# Patient Record
Sex: MALE | Race: WHITE | URBAN - METROPOLITAN AREA
[De-identification: names, ages, dates, MRNs, and addresses within clinical notes are randomized per-mention and may not be internally consistent; named-entity substitution may affect disease eponyms.]

---

## 2017-07-11 ENCOUNTER — EMERGENCY (EMERGENCY)
Facility: HOSPITAL | Age: 72
LOS: 0 days | Discharge: HOME | End: 2017-07-11
Attending: EMERGENCY MEDICINE
Payer: MEDICARE

## 2017-07-11 VITALS
DIASTOLIC BLOOD PRESSURE: 73 MMHG | HEART RATE: 78 BPM | TEMPERATURE: 98 F | OXYGEN SATURATION: 98 % | WEIGHT: 164.91 LBS | RESPIRATION RATE: 16 BRPM | HEIGHT: 69 IN | SYSTOLIC BLOOD PRESSURE: 137 MMHG

## 2017-07-11 VITALS
SYSTOLIC BLOOD PRESSURE: 114 MMHG | RESPIRATION RATE: 17 BRPM | OXYGEN SATURATION: 99 % | HEART RATE: 63 BPM | TEMPERATURE: 98 F | DIASTOLIC BLOOD PRESSURE: 59 MMHG

## 2017-07-11 DIAGNOSIS — Z90.79 ACQUIRED ABSENCE OF OTHER GENITAL ORGAN(S): Chronic | ICD-10-CM

## 2017-07-11 DIAGNOSIS — R07.9 CHEST PAIN, UNSPECIFIED: ICD-10-CM

## 2017-07-11 DIAGNOSIS — E78.00 PURE HYPERCHOLESTEROLEMIA, UNSPECIFIED: ICD-10-CM

## 2017-07-11 DIAGNOSIS — F17.210 NICOTINE DEPENDENCE, CIGARETTES, UNCOMPLICATED: ICD-10-CM

## 2017-07-11 DIAGNOSIS — Z87.19 PERSONAL HISTORY OF OTHER DISEASES OF THE DIGESTIVE SYSTEM: Chronic | ICD-10-CM

## 2017-07-11 DIAGNOSIS — R09.3 ABNORMAL SPUTUM: ICD-10-CM

## 2017-07-11 DIAGNOSIS — R05 COUGH: ICD-10-CM

## 2017-07-11 PROBLEM — Z00.00 ENCOUNTER FOR PREVENTIVE HEALTH EXAMINATION: Status: ACTIVE | Noted: 2017-07-11

## 2017-07-11 LAB
ALBUMIN SERPL ELPH-MCNC: 3.8 G/DL — SIGNIFICANT CHANGE UP (ref 3.3–5)
ALP SERPL-CCNC: 87 U/L — SIGNIFICANT CHANGE UP (ref 40–120)
ALT FLD-CCNC: 31 U/L — SIGNIFICANT CHANGE UP (ref 12–78)
ANION GAP SERPL CALC-SCNC: 8 MMOL/L — SIGNIFICANT CHANGE UP (ref 5–17)
APTT BLD: 32.1 SEC — SIGNIFICANT CHANGE UP (ref 27.5–37.4)
AST SERPL-CCNC: 28 U/L — SIGNIFICANT CHANGE UP (ref 15–37)
BASOPHILS # BLD AUTO: 0.1 K/UL — SIGNIFICANT CHANGE UP (ref 0–0.2)
BASOPHILS NFR BLD AUTO: 0.7 % — SIGNIFICANT CHANGE UP (ref 0–2)
BILIRUB SERPL-MCNC: 0.3 MG/DL — SIGNIFICANT CHANGE UP (ref 0.2–1.2)
BUN SERPL-MCNC: 17 MG/DL — SIGNIFICANT CHANGE UP (ref 7–23)
CALCIUM SERPL-MCNC: 9.3 MG/DL — SIGNIFICANT CHANGE UP (ref 8.5–10.1)
CHLORIDE SERPL-SCNC: 106 MMOL/L — SIGNIFICANT CHANGE UP (ref 96–108)
CK MB BLD-MCNC: 1.4 % — SIGNIFICANT CHANGE UP (ref 0–3.5)
CK MB BLD-MCNC: 1.6 % — SIGNIFICANT CHANGE UP (ref 0–3.5)
CK MB CFR SERPL CALC: 1.6 NG/ML — SIGNIFICANT CHANGE UP (ref 0.5–3.6)
CK MB CFR SERPL CALC: 2.4 NG/ML — SIGNIFICANT CHANGE UP (ref 0.5–3.6)
CK SERPL-CCNC: 116 U/L — SIGNIFICANT CHANGE UP (ref 26–308)
CK SERPL-CCNC: 146 U/L — SIGNIFICANT CHANGE UP (ref 26–308)
CO2 SERPL-SCNC: 28 MMOL/L — SIGNIFICANT CHANGE UP (ref 22–31)
CREAT SERPL-MCNC: 1 MG/DL — SIGNIFICANT CHANGE UP (ref 0.5–1.3)
EOSINOPHIL # BLD AUTO: 0.2 K/UL — SIGNIFICANT CHANGE UP (ref 0–0.5)
EOSINOPHIL NFR BLD AUTO: 1.9 % — SIGNIFICANT CHANGE UP (ref 0–6)
GLUCOSE SERPL-MCNC: 90 MG/DL — SIGNIFICANT CHANGE UP (ref 70–99)
HCT VFR BLD CALC: 40.5 % — SIGNIFICANT CHANGE UP (ref 39–50)
HGB BLD-MCNC: 14.4 G/DL — SIGNIFICANT CHANGE UP (ref 13–17)
INR BLD: 0.95 RATIO — SIGNIFICANT CHANGE UP (ref 0.88–1.16)
LYMPHOCYTES # BLD AUTO: 2.2 K/UL — SIGNIFICANT CHANGE UP (ref 1–3.3)
LYMPHOCYTES # BLD AUTO: 22.7 % — SIGNIFICANT CHANGE UP (ref 13–44)
MCHC RBC-ENTMCNC: 31.4 PG — SIGNIFICANT CHANGE UP (ref 27–34)
MCHC RBC-ENTMCNC: 35.6 GM/DL — SIGNIFICANT CHANGE UP (ref 32–36)
MCV RBC AUTO: 88.1 FL — SIGNIFICANT CHANGE UP (ref 80–100)
MONOCYTES # BLD AUTO: 0.5 K/UL — SIGNIFICANT CHANGE UP (ref 0–0.9)
MONOCYTES NFR BLD AUTO: 5.2 % — SIGNIFICANT CHANGE UP (ref 2–14)
NEUTROPHILS # BLD AUTO: 6.7 K/UL — SIGNIFICANT CHANGE UP (ref 1.8–7.4)
NEUTROPHILS NFR BLD AUTO: 69.5 % — SIGNIFICANT CHANGE UP (ref 43–77)
NT-PROBNP SERPL-SCNC: 57 PG/ML — SIGNIFICANT CHANGE UP (ref 0–125)
PLATELET # BLD AUTO: 228 K/UL — SIGNIFICANT CHANGE UP (ref 150–400)
POTASSIUM SERPL-MCNC: 5 MMOL/L — SIGNIFICANT CHANGE UP (ref 3.5–5.3)
POTASSIUM SERPL-SCNC: 5 MMOL/L — SIGNIFICANT CHANGE UP (ref 3.5–5.3)
PROT SERPL-MCNC: 7.5 GM/DL — SIGNIFICANT CHANGE UP (ref 6–8.3)
PROTHROM AB SERPL-ACNC: 10.3 SEC — SIGNIFICANT CHANGE UP (ref 9.8–12.7)
RBC # BLD: 4.6 M/UL — SIGNIFICANT CHANGE UP (ref 4.2–5.8)
RBC # FLD: 12.3 % — SIGNIFICANT CHANGE UP (ref 11–15)
SODIUM SERPL-SCNC: 142 MMOL/L — SIGNIFICANT CHANGE UP (ref 135–145)
TROPONIN I SERPL-MCNC: <.015 NG/ML — SIGNIFICANT CHANGE UP (ref 0.01–0.04)
TROPONIN I SERPL-MCNC: <.015 NG/ML — SIGNIFICANT CHANGE UP (ref 0.01–0.04)
WBC # BLD: 9.6 K/UL — SIGNIFICANT CHANGE UP (ref 3.8–10.5)
WBC # FLD AUTO: 9.6 K/UL — SIGNIFICANT CHANGE UP (ref 3.8–10.5)

## 2017-07-11 PROCEDURE — 71010: CPT | Mod: 26

## 2017-07-11 PROCEDURE — 99285 EMERGENCY DEPT VISIT HI MDM: CPT

## 2017-07-11 RX ORDER — KETOROLAC TROMETHAMINE 30 MG/ML
15 SYRINGE (ML) INJECTION ONCE
Qty: 0 | Refills: 0 | Status: DISCONTINUED | OUTPATIENT
Start: 2017-07-11 | End: 2017-07-11

## 2017-07-11 RX ORDER — PANTOPRAZOLE SODIUM 20 MG/1
40 TABLET, DELAYED RELEASE ORAL ONCE
Qty: 0 | Refills: 0 | Status: COMPLETED | OUTPATIENT
Start: 2017-07-11 | End: 2017-07-11

## 2017-07-11 NOTE — ED PROVIDER NOTE - OBJECTIVE STATEMENT
71 years old male walked in c 71 years old male walked in c/o mid sternal chest pain 6/10 constant for 3 days but denies sob, dizziness, nausea, vomiting, abd pain. Pt also c/o productive coughs with yellow sputum for few days. Pt sts the chest pain increases with coughing and body movements. Pt denies recent hx of trauma, blurred visions, light sensitivities, fever, chills, dysuria, or irregular bowel movements. Pt sts he had a normal stress test few years ago.

## 2017-07-11 NOTE — ED ADULT NURSE NOTE - OBJECTIVE STATEMENT
patient received, came here for non radiating chest pain x 3 days. no sob noted. denies n/v/d. denies dizziness.

## 2017-07-11 NOTE — ED PROVIDER NOTE - PROGRESS NOTE DETAILS
Pt is alert and oriented x 3 sitting up eating and tolerating lunch now. Pt denies headache, dizziness, sob, chest pain, nausea, vomiting, abd pain first CE is negative and 2nd CE is due at 1400 pm. Pt remain very comfortable sitting up talking to his wife pt and his wife are given and explained all test reports and advised to see his cardiologist as soon as possible for out pt stress test. Pt denies headache, dizziness, sob, chest pain, nausea, vomiting, fever, chills, abd pain.

## 2017-07-11 NOTE — ED PROVIDER NOTE - CONSTITUTIONAL, MLM
normal... Well appearing, well nourished, awake, alert, oriented to person, place, time/situation and in no apparent distress. Speaking in clear full sentences no nasal flaring no shoulders retractions no diaphoresis not holding his chest, appears very comfortable sitting up at the edge of the stretcher in a bright light room.

## 2017-07-12 PROCEDURE — 93010 ELECTROCARDIOGRAM REPORT: CPT

## 2018-09-13 ENCOUNTER — EMERGENCY (EMERGENCY)
Facility: HOSPITAL | Age: 73
LOS: 0 days | Discharge: HOME | End: 2018-09-13
Attending: STUDENT IN AN ORGANIZED HEALTH CARE EDUCATION/TRAINING PROGRAM
Payer: MEDICARE

## 2018-09-13 VITALS
HEIGHT: 69 IN | RESPIRATION RATE: 16 BRPM | HEART RATE: 88 BPM | TEMPERATURE: 98 F | OXYGEN SATURATION: 97 % | WEIGHT: 160.06 LBS | SYSTOLIC BLOOD PRESSURE: 150 MMHG | DIASTOLIC BLOOD PRESSURE: 82 MMHG

## 2018-09-13 VITALS
RESPIRATION RATE: 17 BRPM | HEART RATE: 58 BPM | DIASTOLIC BLOOD PRESSURE: 68 MMHG | TEMPERATURE: 97 F | OXYGEN SATURATION: 96 % | SYSTOLIC BLOOD PRESSURE: 113 MMHG

## 2018-09-13 DIAGNOSIS — Z90.79 ACQUIRED ABSENCE OF OTHER GENITAL ORGAN(S): Chronic | ICD-10-CM

## 2018-09-13 DIAGNOSIS — Z87.19 PERSONAL HISTORY OF OTHER DISEASES OF THE DIGESTIVE SYSTEM: Chronic | ICD-10-CM

## 2018-09-13 PROBLEM — E78.00 PURE HYPERCHOLESTEROLEMIA, UNSPECIFIED: Chronic | Status: ACTIVE | Noted: 2017-07-11

## 2018-09-13 LAB
ALBUMIN SERPL ELPH-MCNC: 3.8 G/DL — SIGNIFICANT CHANGE UP (ref 3.3–5)
ALP SERPL-CCNC: 102 U/L — SIGNIFICANT CHANGE UP (ref 40–120)
ALT FLD-CCNC: 23 U/L — SIGNIFICANT CHANGE UP (ref 12–78)
ANION GAP SERPL CALC-SCNC: 7 MMOL/L — SIGNIFICANT CHANGE UP (ref 5–17)
APTT BLD: 30.2 SEC — SIGNIFICANT CHANGE UP (ref 27.5–37.4)
AST SERPL-CCNC: 22 U/L — SIGNIFICANT CHANGE UP (ref 15–37)
BASOPHILS # BLD AUTO: 0.04 K/UL — SIGNIFICANT CHANGE UP (ref 0–0.2)
BASOPHILS NFR BLD AUTO: 0.4 % — SIGNIFICANT CHANGE UP (ref 0–2)
BILIRUB SERPL-MCNC: 0.3 MG/DL — SIGNIFICANT CHANGE UP (ref 0.2–1.2)
BUN SERPL-MCNC: 20 MG/DL — SIGNIFICANT CHANGE UP (ref 7–23)
CALCIUM SERPL-MCNC: 8.9 MG/DL — SIGNIFICANT CHANGE UP (ref 8.5–10.1)
CHLORIDE SERPL-SCNC: 107 MMOL/L — SIGNIFICANT CHANGE UP (ref 96–108)
CK MB BLD-MCNC: 1.8 % — SIGNIFICANT CHANGE UP (ref 0–3.5)
CK MB CFR SERPL CALC: 2.3 NG/ML — SIGNIFICANT CHANGE UP (ref 0.5–3.6)
CK SERPL-CCNC: 131 U/L — SIGNIFICANT CHANGE UP (ref 26–308)
CO2 SERPL-SCNC: 28 MMOL/L — SIGNIFICANT CHANGE UP (ref 22–31)
CREAT SERPL-MCNC: 0.94 MG/DL — SIGNIFICANT CHANGE UP (ref 0.5–1.3)
EOSINOPHIL # BLD AUTO: 0.21 K/UL — SIGNIFICANT CHANGE UP (ref 0–0.5)
EOSINOPHIL NFR BLD AUTO: 2.2 % — SIGNIFICANT CHANGE UP (ref 0–6)
GLUCOSE SERPL-MCNC: 84 MG/DL — SIGNIFICANT CHANGE UP (ref 70–99)
HCT VFR BLD CALC: 42.3 % — SIGNIFICANT CHANGE UP (ref 39–50)
HGB BLD-MCNC: 14.2 G/DL — SIGNIFICANT CHANGE UP (ref 13–17)
IMM GRANULOCYTES NFR BLD AUTO: 0.1 % — SIGNIFICANT CHANGE UP (ref 0–1.5)
INR BLD: 0.97 RATIO — SIGNIFICANT CHANGE UP (ref 0.88–1.16)
LYMPHOCYTES # BLD AUTO: 2.34 K/UL — SIGNIFICANT CHANGE UP (ref 1–3.3)
LYMPHOCYTES # BLD AUTO: 24.7 % — SIGNIFICANT CHANGE UP (ref 13–44)
MCHC RBC-ENTMCNC: 30.3 PG — SIGNIFICANT CHANGE UP (ref 27–34)
MCHC RBC-ENTMCNC: 33.6 GM/DL — SIGNIFICANT CHANGE UP (ref 32–36)
MCV RBC AUTO: 90.2 FL — SIGNIFICANT CHANGE UP (ref 80–100)
MONOCYTES # BLD AUTO: 0.6 K/UL — SIGNIFICANT CHANGE UP (ref 0–0.9)
MONOCYTES NFR BLD AUTO: 6.3 % — SIGNIFICANT CHANGE UP (ref 2–14)
NEUTROPHILS # BLD AUTO: 6.27 K/UL — SIGNIFICANT CHANGE UP (ref 1.8–7.4)
NEUTROPHILS NFR BLD AUTO: 66.3 % — SIGNIFICANT CHANGE UP (ref 43–77)
NRBC # BLD: 0 /100 WBCS — SIGNIFICANT CHANGE UP (ref 0–0)
PLATELET # BLD AUTO: 266 K/UL — SIGNIFICANT CHANGE UP (ref 150–400)
POTASSIUM SERPL-MCNC: 4.2 MMOL/L — SIGNIFICANT CHANGE UP (ref 3.5–5.3)
POTASSIUM SERPL-SCNC: 4.2 MMOL/L — SIGNIFICANT CHANGE UP (ref 3.5–5.3)
PROT SERPL-MCNC: 7.7 GM/DL — SIGNIFICANT CHANGE UP (ref 6–8.3)
PROTHROM AB SERPL-ACNC: 10.6 SEC — SIGNIFICANT CHANGE UP (ref 9.8–12.7)
RBC # BLD: 4.69 M/UL — SIGNIFICANT CHANGE UP (ref 4.2–5.8)
RBC # FLD: 13 % — SIGNIFICANT CHANGE UP (ref 10.3–14.5)
SODIUM SERPL-SCNC: 142 MMOL/L — SIGNIFICANT CHANGE UP (ref 135–145)
TROPONIN I SERPL-MCNC: <.015 NG/ML — SIGNIFICANT CHANGE UP (ref 0.01–0.04)
WBC # BLD: 9.47 K/UL — SIGNIFICANT CHANGE UP (ref 3.8–10.5)
WBC # FLD AUTO: 9.47 K/UL — SIGNIFICANT CHANGE UP (ref 3.8–10.5)

## 2018-09-13 PROCEDURE — 70450 CT HEAD/BRAIN W/O DYE: CPT | Mod: 26

## 2018-09-13 PROCEDURE — 70551 MRI BRAIN STEM W/O DYE: CPT | Mod: 26

## 2018-09-13 PROCEDURE — 99284 EMERGENCY DEPT VISIT MOD MDM: CPT

## 2018-09-13 PROCEDURE — 71045 X-RAY EXAM CHEST 1 VIEW: CPT | Mod: 26

## 2018-09-13 RX ORDER — MECLIZINE HCL 12.5 MG
1 TABLET ORAL
Qty: 45 | Refills: 0
Start: 2018-09-13 | End: 2018-09-27

## 2018-09-13 RX ORDER — SODIUM CHLORIDE 9 MG/ML
3 INJECTION INTRAMUSCULAR; INTRAVENOUS; SUBCUTANEOUS ONCE
Qty: 0 | Refills: 0 | Status: COMPLETED | OUTPATIENT
Start: 2018-09-13 | End: 2018-09-13

## 2018-09-13 RX ORDER — MECLIZINE HCL 12.5 MG
50 TABLET ORAL ONCE
Qty: 0 | Refills: 0 | Status: COMPLETED | OUTPATIENT
Start: 2018-09-13 | End: 2018-09-13

## 2018-09-13 RX ADMIN — SODIUM CHLORIDE 3 MILLILITER(S): 9 INJECTION INTRAMUSCULAR; INTRAVENOUS; SUBCUTANEOUS at 13:08

## 2018-09-13 RX ADMIN — Medication 50 MILLIGRAM(S): at 13:44

## 2018-09-13 NOTE — ED ADULT NURSE NOTE - OBJECTIVE STATEMENT
complaint of diziness starting this morning, denies n/v reports feels like the room spinning, denies cp, sob, villanueva.

## 2018-09-13 NOTE — ED ADULT NURSE NOTE - NSIMPLEMENTINTERV_GEN_ALL_ED
Implemented All Universal Safety Interventions:  [place] to call system. Call bell, personal items and telephone within reach. Instruct patient to call for assistance. Room bathroom lighting operational. Non-slip footwear when patient is off stretcher. Physically safe environment: no spills, clutter or unnecessary equipment. Stretcher in lowest position, wheels locked, appropriate side rails in place.

## 2018-09-13 NOTE — ED PROVIDER NOTE - OBJECTIVE STATEMENT
72 year old male presents today c/o dizziness since this morning since waking up, he denies nausea or vomiting, (-) h/o vertigo (-) trauma (-) paresthesias , he describes his symptoms worsening with positional changes especially bending forward (-) chest pain (-) sob (-) palpitations (-) leg edema or calf pain

## 2018-09-13 NOTE — ED PROVIDER NOTE - PROGRESS NOTE DETAILS
pt still feels dizzy despite antivert, mri head ordered case discussed with dr barlow, states that the patient can follow up in his office pt able to eat, gait steady

## 2018-09-14 DIAGNOSIS — R42 DIZZINESS AND GIDDINESS: ICD-10-CM

## 2018-09-14 DIAGNOSIS — Z85.46 PERSONAL HISTORY OF MALIGNANT NEOPLASM OF PROSTATE: ICD-10-CM

## 2018-09-14 DIAGNOSIS — E78.00 PURE HYPERCHOLESTEROLEMIA, UNSPECIFIED: ICD-10-CM

## 2018-12-04 NOTE — ED ADULT NURSE NOTE - NS ED PATIENT SAFETY CONCERN
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   Discharge from skilled therapy with instructions/recommendations: continue home and gym program and progress activity with patellofemoral precautions, return to MD if pain escalates, recurrence of swelling or catching/give-way symptoms right knee as advanced activity level.     PLAN    Discharge from therapy    Goals: To be obtained by end of this plan of care:  1. Patient will be independent with progressed and modified home exercise program   · Met  2. Decrease pain/symptoms to < 1/10  · Current pain 1-2/10  3. Improve involved strength to 5/5  · Met  4. Improve involved range of motion to within normal limits compared to uninvolved  · Met for flexion, mild limitation extension   through improvements listed above patient will:  5. Be able to ambulate for 1 mile in community on level and unlevel terrain without pain/difficulty  · Met on level terrain, moderate difficulty unlevel terrain  6. Be able to ascend and descend 1 flight of stairs using reciprocal pattern without pain/difficulty  · Good control but continued pain with reciprocal gait on stairs  7. Be able to complete independent transfers without pain/difficulty  · Anterior knee pain with arising from sitting  8. Be able to ambulate safely and timely across the street  · Met  9. Be able to bend/squat without pain/difficulty to improve age appropriate  activities, yard tasks  · Able to do tasks but with anterior knee pain  10. Lower Extremity Functional Scale: Patient will complete form to reflect an improved score from initial score of 60 to greater than or equal to 69 (0=extreme difficulty; 80=no difficulty) to indicate pt reported improvement in function/disability/impairment (minimal detectable change: 9 points).   · Minimal improvement to 64 primarily due to ongoing patellofemoral pain     Discharge Measures:   Total Number of Visits: 5  Treatment Category: Knee, Other Non-Surgical  Outcome Measure: above  Primary Clinician: Jesica Beebe      Physical Therapy Daily Treatment    Visit Count: 5  Plan of Care: 11/5/2018 Through: 12/17/2018  Insurance Information:   Therapy Benefits  Payor: [employer]  Authorization Needed: no  Maximum Visit Limit Per Year: 60 PT/ST/OT  Referred by: Juan Conway MD; Next provider visit (if known/scheduled):   Medical Diagnosis (from order): S83.241A Acute medial meniscus tear of right knee, initial encounter  (primary encounter diagnosis)  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    Functional Change:   Walking faster and longer distances  Has been working out regularly at gym. Does not have as much soreness and recovers faster after workouts.    OBJECTIVE     Observation/Gait:   Reduced genu flexum right knee and normal luis walking overground; mild anterior right knee pain with reciprocal ambulation on stairs     Range of Motion (degrees)    Left Right Right   Date Initial Initial 12/04/2018  Pre/post   Knee Flexion (135) 138 130* 135   Knee Extension (0-5) +2 -8* -5/-2   Reported in degrees, active range of motion recorded unless noted as AA=active  assistive or P=passive; standard testing positions unless otherwise noted, norms included in ( ); *=pain   All motions within functional/normal limits except as noted.    Knee Strength: 5/5 right and left quadriceps and hamstring without pain to resistance     Joint Play Assessment:  Minimal restriction right patellar glide medial > lateral and superior > inferior    Treatment   Therapeutic Exercise/activity:   Review prior home/gym program.  Instructed in 10 rep max theory for resistance progression.   Instructed in use of elliptical :  Set up, parameters  Instructed in proprioception exercises to be done at gym in single leg stance progressing from floor to foam airex pad to dynadisc or foam wedge    Encouraged to ice post-exercises if increased pain    Manual Therapy:   Peripatellar soft Tissue Mobilization right knee  Patellar mobilization right knee - all planes right knee  Soft Tissue Mobilization right iliotibial band sidelying, quad supine, hamstring and calf prone    Skilled input: Manual technique, therapeutic exercise progression with 10 reps max theory, patellofemoral joint precautions during home/gym program, pain management strategies    Home Program:   Access Code: [email or web address]   Date: 11/05/2018   Exercises   · Recumbent Bike - 1 sets - 10-20 minutes hold - 3x weekly   · Sidelying IT Band Foam Roll Mobilization - 1 sets - 1 minute hold - 1x daily - 5-7x weekly   · Seated Table Hamstring Stretch - 3 reps - 1 sets - 20-30 hold - 1x daily - 7x weekly   · Standing Hamstring Stretch on Chair - 3 reps - 1 sets - 20-30 hold - 1x daily - 7x weekly   · ITB Stretch at Wall - 3 reps - 1 sets - 20-30 hold - 1x daily - 7x weekly   · Soleus Stretch on Wall - 3 reps - 1 sets - 20-30 hold - 1x daily - 7x weekly     12/04/18:  Gym program:  Bike and elliptical , Leg press, free motion (or multi-hip), prone leg curl, step ups, TRX squats, Single leg heel raises right, Proprioceptive exercises with foam aire  ex pad, dynadisc and/or foam wedge     Writer verbally educated the patient and received verbal consent from the patient on hand placement, positioning of patient, and techniques to be performed today including manual therapy and therapeutic exercises and how they are pertinent to the patient's plan of care.      Suggestions for next session as indicated: discharge to home and gym program    ASSESSMENT   Met or progressing on goals.  Independent with home/gym program addressing remaining deficits and verbalized understanding of patellofemoral precautions.     Pain after treatment (patient reported, 0-10 scale): not rated.  No increased pain after session  Result of above outlined education: Verbalizes understanding and Demonstrates understanding    THERAPY DAILY BILLING   Insurance: [employer]. N/A    Evaluation Procedures:  No evaluation codes were used on this date of service    Timed Procedures:  Manual Therapy, 20 minutes  Therapeutic Exercise, 25 minutes    Untimed Procedures:  No untimed codes were used on this date of service    Total Treatment Time: 45 minutes   No

## 2019-06-27 ENCOUNTER — INPATIENT (INPATIENT)
Facility: HOSPITAL | Age: 74
LOS: 5 days | Discharge: HOME | End: 2019-07-03
Attending: HOSPITALIST | Admitting: HOSPITALIST
Payer: MEDICARE

## 2019-06-27 VITALS
SYSTOLIC BLOOD PRESSURE: 109 MMHG | OXYGEN SATURATION: 100 % | DIASTOLIC BLOOD PRESSURE: 54 MMHG | TEMPERATURE: 98 F | RESPIRATION RATE: 18 BRPM | HEART RATE: 93 BPM

## 2019-06-27 DIAGNOSIS — Z90.79 ACQUIRED ABSENCE OF OTHER GENITAL ORGAN(S): Chronic | ICD-10-CM

## 2019-06-27 DIAGNOSIS — M79.89 OTHER SPECIFIED SOFT TISSUE DISORDERS: ICD-10-CM

## 2019-06-27 DIAGNOSIS — Z87.19 PERSONAL HISTORY OF OTHER DISEASES OF THE DIGESTIVE SYSTEM: Chronic | ICD-10-CM

## 2019-06-27 LAB
ALBUMIN SERPL ELPH-MCNC: 3.2 G/DL — LOW (ref 3.3–5)
ALP SERPL-CCNC: 109 U/L — SIGNIFICANT CHANGE UP (ref 40–120)
ALT FLD-CCNC: 35 U/L — SIGNIFICANT CHANGE UP (ref 4–41)
ANION GAP SERPL CALC-SCNC: 14 MMO/L — SIGNIFICANT CHANGE UP (ref 7–14)
ANISOCYTOSIS BLD QL: SLIGHT — SIGNIFICANT CHANGE UP
APTT BLD: 29.4 SEC — SIGNIFICANT CHANGE UP (ref 27.5–36.3)
AST SERPL-CCNC: 29 U/L — SIGNIFICANT CHANGE UP (ref 4–40)
BASE EXCESS BLDV CALC-SCNC: 2.8 MMOL/L — SIGNIFICANT CHANGE UP
BASOPHILS # BLD AUTO: 0.03 K/UL — SIGNIFICANT CHANGE UP (ref 0–0.2)
BASOPHILS NFR BLD AUTO: 0.5 % — SIGNIFICANT CHANGE UP (ref 0–2)
BASOPHILS NFR SPEC: 0 % — SIGNIFICANT CHANGE UP (ref 0–2)
BILIRUB SERPL-MCNC: 0.5 MG/DL — SIGNIFICANT CHANGE UP (ref 0.2–1.2)
BLASTS # FLD: 0 % — SIGNIFICANT CHANGE UP (ref 0–0)
BLOOD GAS VENOUS - CREATININE: SIGNIFICANT CHANGE UP MG/DL (ref 0.5–1.3)
BLOOD GAS VENOUS - FIO2: 21 — SIGNIFICANT CHANGE UP
BUN SERPL-MCNC: 22 MG/DL — SIGNIFICANT CHANGE UP (ref 7–23)
CALCIUM SERPL-MCNC: 10 MG/DL — SIGNIFICANT CHANGE UP (ref 8.4–10.5)
CHLORIDE BLDV-SCNC: 100 MMOL/L — SIGNIFICANT CHANGE UP (ref 96–108)
CHLORIDE SERPL-SCNC: 97 MMOL/L — LOW (ref 98–107)
CO2 SERPL-SCNC: 21 MMOL/L — LOW (ref 22–31)
CREAT SERPL-MCNC: 1 MG/DL — SIGNIFICANT CHANGE UP (ref 0.5–1.3)
CRP SERPL-MCNC: 288 MG/L — HIGH
EOSINOPHIL # BLD AUTO: 0.01 K/UL — SIGNIFICANT CHANGE UP (ref 0–0.5)
EOSINOPHIL NFR BLD AUTO: 0.2 % — SIGNIFICANT CHANGE UP (ref 0–6)
EOSINOPHIL NFR FLD: 0 % — SIGNIFICANT CHANGE UP (ref 0–6)
ERYTHROCYTE [SEDIMENTATION RATE] IN BLOOD: 44 MM/HR — HIGH (ref 1–15)
GAS PNL BLDV: 129 MMOL/L — LOW (ref 136–146)
GLUCOSE BLDV-MCNC: 102 MG/DL — HIGH (ref 70–99)
GLUCOSE SERPL-MCNC: 100 MG/DL — HIGH (ref 70–99)
HCO3 BLDV-SCNC: 26 MMOL/L — SIGNIFICANT CHANGE UP (ref 20–27)
HCT VFR BLD CALC: 34.9 % — LOW (ref 39–50)
HCT VFR BLDV CALC: 37.5 % — LOW (ref 39–51)
HGB BLD-MCNC: 11.7 G/DL — LOW (ref 13–17)
HGB BLDV-MCNC: 12.2 G/DL — LOW (ref 13–17)
IMM GRANULOCYTES NFR BLD AUTO: 7.1 % — HIGH (ref 0–1.5)
INR BLD: 1.33 — HIGH (ref 0.88–1.17)
LACTATE BLDV-MCNC: 2 MMOL/L — SIGNIFICANT CHANGE UP (ref 0.5–2)
LYMPHOCYTES # BLD AUTO: 0.46 K/UL — LOW (ref 1–3.3)
LYMPHOCYTES # BLD AUTO: 8 % — LOW (ref 13–44)
LYMPHOCYTES NFR SPEC AUTO: 5.2 % — LOW (ref 13–44)
MACROCYTES BLD QL: SLIGHT — SIGNIFICANT CHANGE UP
MCHC RBC-ENTMCNC: 31.5 PG — SIGNIFICANT CHANGE UP (ref 27–34)
MCHC RBC-ENTMCNC: 33.5 % — SIGNIFICANT CHANGE UP (ref 32–36)
MCV RBC AUTO: 93.8 FL — SIGNIFICANT CHANGE UP (ref 80–100)
METAMYELOCYTES # FLD: 0.9 % — SIGNIFICANT CHANGE UP (ref 0–1)
MONOCYTES # BLD AUTO: 0.9 K/UL — SIGNIFICANT CHANGE UP (ref 0–0.9)
MONOCYTES NFR BLD AUTO: 15.6 % — HIGH (ref 2–14)
MONOCYTES NFR BLD: 8.7 % — SIGNIFICANT CHANGE UP (ref 2–9)
MYELOCYTES NFR BLD: 0 % — SIGNIFICANT CHANGE UP (ref 0–0)
NEUTROPHIL AB SER-ACNC: 75.6 % — SIGNIFICANT CHANGE UP (ref 43–77)
NEUTROPHILS # BLD AUTO: 3.96 K/UL — SIGNIFICANT CHANGE UP (ref 1.8–7.4)
NEUTROPHILS NFR BLD AUTO: 68.6 % — SIGNIFICANT CHANGE UP (ref 43–77)
NEUTS BAND # BLD: 7 % — HIGH (ref 0–6)
NRBC # FLD: 0 K/UL — SIGNIFICANT CHANGE UP (ref 0–0)
OTHER - HEMATOLOGY %: 0 — SIGNIFICANT CHANGE UP
PCO2 BLDV: 37 MMHG — LOW (ref 41–51)
PH BLDV: 7.47 PH — HIGH (ref 7.32–7.43)
PLATELET # BLD AUTO: 261 K/UL — SIGNIFICANT CHANGE UP (ref 150–400)
PLATELET COUNT - ESTIMATE: NORMAL — SIGNIFICANT CHANGE UP
PMV BLD: 8.7 FL — SIGNIFICANT CHANGE UP (ref 7–13)
PO2 BLDV: < 24 MMHG — LOW (ref 35–40)
POLYCHROMASIA BLD QL SMEAR: SLIGHT — SIGNIFICANT CHANGE UP
POTASSIUM BLDV-SCNC: 4.4 MMOL/L — SIGNIFICANT CHANGE UP (ref 3.4–4.5)
POTASSIUM SERPL-MCNC: 5.2 MMOL/L — SIGNIFICANT CHANGE UP (ref 3.5–5.3)
POTASSIUM SERPL-SCNC: 5.2 MMOL/L — SIGNIFICANT CHANGE UP (ref 3.5–5.3)
PROMYELOCYTES # FLD: 0 % — SIGNIFICANT CHANGE UP (ref 0–0)
PROT SERPL-MCNC: 7.3 G/DL — SIGNIFICANT CHANGE UP (ref 6–8.3)
PROTHROM AB SERPL-ACNC: 14.9 SEC — HIGH (ref 9.8–13.1)
RBC # BLD: 3.72 M/UL — LOW (ref 4.2–5.8)
RBC # FLD: 14.5 % — SIGNIFICANT CHANGE UP (ref 10.3–14.5)
SAO2 % BLDV: 42.3 % — LOW (ref 60–85)
SODIUM SERPL-SCNC: 132 MMOL/L — LOW (ref 135–145)
VARIANT LYMPHS # BLD: 2.6 % — SIGNIFICANT CHANGE UP
WBC # BLD: 5.77 K/UL — SIGNIFICANT CHANGE UP (ref 3.8–10.5)
WBC # FLD AUTO: 5.77 K/UL — SIGNIFICANT CHANGE UP (ref 3.8–10.5)

## 2019-06-27 PROCEDURE — 99223 1ST HOSP IP/OBS HIGH 75: CPT

## 2019-06-27 PROCEDURE — 93971 EXTREMITY STUDY: CPT | Mod: 26,LT

## 2019-06-27 PROCEDURE — 71046 X-RAY EXAM CHEST 2 VIEWS: CPT | Mod: 26

## 2019-06-27 PROCEDURE — 73610 X-RAY EXAM OF ANKLE: CPT | Mod: 26,RT

## 2019-06-27 PROCEDURE — 73562 X-RAY EXAM OF KNEE 3: CPT | Mod: 26,RT

## 2019-06-27 PROCEDURE — 73590 X-RAY EXAM OF LOWER LEG: CPT | Mod: 26,RT

## 2019-06-27 RX ORDER — OXYCODONE AND ACETAMINOPHEN 5; 325 MG/1; MG/1
1 TABLET ORAL ONCE
Refills: 0 | Status: DISCONTINUED | OUTPATIENT
Start: 2019-06-27 | End: 2019-06-27

## 2019-06-27 RX ADMIN — OXYCODONE AND ACETAMINOPHEN 1 TABLET(S): 5; 325 TABLET ORAL at 22:13

## 2019-06-27 NOTE — ED ADULT NURSE NOTE - NSIMPLEMENTINTERV_GEN_ALL_ED
Implemented All Universal Safety Interventions:  [place] to call system. Call bell, personal items and telephone within reach. Instruct patient to call for assistance. Room bathroom lighting operational. Non-slip footwear when patient is off stretcher. Physically safe environment: no spills, clutter or unnecessary equipment. Stretcher in lowest position, wheels locked, appropriate side rails in place.

## 2019-06-27 NOTE — ED PROVIDER NOTE - OBJECTIVE STATEMENT
73 year old M PMH PMR (inactive for several years), Prostate Ca s/p prostatectomy presents with ten day hx of progressive RLE swelling. Patient was admitted to [facility] on June 16th for stroke; MRI head was negative at time but he did start having R ankle pain pain. Since then he has been seen by a podiatrist who started him on a steroid taper for osteoarthritis with no improvement in symptoms. Then 5 days ago he had a steroid injection in the ankle and since then he has been having worsening swelling of the leg swelling and pain and can no longer walk due to the pain and swelling. He endorses pain in the knee, calf and ankle. He had increased warmth of the R knee yesterday but no erythema. He is able to invert/zaida his ankle, albeit with restriction due to pain/swelling. He has not taken anything for pain. Denies any trauma to the leg.  Patient is a smoker. Reports chronic cough. Had night sweats last night. +Recent travel within the [place]. No shortness of breath but endorse intermittent midsternal chest pain described as "pricking" with deep breath.

## 2019-06-27 NOTE — H&P ADULT - NSHPREVIEWOFSYSTEMS_GEN_ALL_CORE
CONSTITUTIONAL:  +night sweats No weight loss, fever, chills, weakness or fatigue.  HEENT:  Eyes:  No visual loss, blurred vision, double vision or yellow sclerae. Ears, Nose, Throat:  No hearing loss, sneezing, congestion, runny nose or sore throat.  SKIN:  No rash or itching.  CARDIOVASCULAR:  +pleuritic chest pain No chest pressure or chest discomfort. No palpitations or edema.  RESPIRATORY:  No shortness of breath, cough or sputum.  GASTROINTESTINAL: +constipation No anorexia, nausea, vomiting or diarrhea. No abdominal pain or blood.  GENITOURINARY:  Denies hematuria, dysuria.   NEUROLOGICAL:  No headache, dizziness, syncope, paralysis, ataxia, numbness or tingling in the extremities. No change in bowel or bladder control.  MUSCULOSKELETAL: +joint pain No muscle, back pain,   HEMATOLOGIC:  No anemia, bleeding or bruising.  LYMPHATICS:  No enlarged nodes. No history of splenectomy.  PSYCHIATRIC:  No history of depression or anxiety.  ENDOCRINOLOGIC:  No reports of sweating, cold or heat intolerance. No polyuria or polydipsia.  ALLERGIES:  No history of asthma, hives, eczema or rhinitis.

## 2019-06-27 NOTE — ED PROVIDER NOTE - NS ED ROS FT
General: denies fever, chills, weight loss/weight gain.  HENT: denies nasal congestion, sore throat, rhinorrhea, ear pain  Eyes: denies visual changes, blurred vision, eye discharge, eye redness  Neck: denies neck pain, neck swelling  CV: Denies palpitations  Resp: denies difficulty breathing, cough  Abdominal: denies nausea, vomiting, diarrhea, abdominal pain  MSK: see HPI  Neuro: denies headaches, numbness, tingling, dizziness, lightheadedness.  Skin: denies rashes, cuts, bruises  Hematologic: denies unexplained bruises

## 2019-06-27 NOTE — H&P ADULT - PROBLEM SELECTOR PLAN 1
-Patient with right knee and ankle swelling and pain, mild erythema. Xrays reviewed, small right knee effusion and soft tissue swelling around ankle, no evidence of acute fracture. US negative for DVT, significant for hypoechoic lesion likely hematoma vs cyst, unlikely to fully explain pain  -No leukocytosis however ESR and CRP noted to be elevated, with reported history of night sweats. Low suspicion for infectious etiology, unlikely septic joint given full ROM of knee and ankle with mild pain. Will hold off on abx  -Unlikely acute flare of polymyalgia rheumatica as it very rarely affects feet or ankles.   -Follow up uric acid, QUENTIN, rheumatoid factor, CCP, CK and TSH  -Will trial on naproxen 500 mg BID given normal renal function, no bleeding risks. Percocet as needed for severe breakthrough pain   -Rheumatology c/s in AM  -PT consult given impaired mobility 2/2 to pain -Patient with right knee and ankle swelling and pain, mild erythema. Xrays reviewed, small right knee effusion and soft tissue swelling around ankle, no evidence of acute fracture. US negative for DVT, significant for hypoechoic lesion likely hematoma vs cyst, unlikely to fully explain pain  -No leukocytosis however ESR and CRP noted to be elevated, with reported history of night sweats.  - Low suspicion for infectious etiology, unlikely septic joint given full ROM of knee and ankle with mild pain. Will hold off on abx. Check procalcitonin   -Concern for inflammatory arthropathy given h/o PMR however unlikely acute flare of polymyalgia rheumatica as it very rarely affects feet or ankles.   -Follow up uric acid, QUENTIN, rheumatoid factor, CCP, CK   -Pt s/p medrol pack with mild improvement, may require extended taper. Will trial on naproxen 500 mg BID for now given normal renal function, no bleeding risks. Percocet as needed for severe breakthrough pain   -Rheumatology c/s in AM. Consider sampling knee effusion if large enough  -PT consult given impaired mobility 2/2 to pain -Patient with right knee and ankle swelling and pain, mild erythema with pain on palpation of right calf. Xrays reviewed, small right knee effusion and soft tissue swelling around ankle, no evidence of acute fracture. US negative for DVT, significant for hypoechoic lesion likely hematoma vs cyst, unlikely to fully explain pain  -No leukocytosis however ESR and CRP noted to be elevated, with reported history of night sweats.  - Low suspicion for infectious etiology, unlikely septic joint given full ROM of knee and ankle with mild pain. Will hold off on abx. Check procalcitonin   -Concern for inflammatory arthropathy given h/o PMR however unlikely acute flare of polymyalgia rheumatica as it very rarely affects feet or ankles.   -Follow up uric acid, QUENTIN, rheumatoid factor, CCP, CK   -Pt s/p medrol pack with mild improvement, may require extended taper. Will trial on naproxen 500 mg BID for now given normal renal function, no bleeding risks. Percocet as needed for severe breakthrough pain   -Rheumatology c/s in AM. Consider sampling knee effusion if large enough  -PT consult given impaired mobility 2/2 to pain -Patient with right knee and ankle swelling and pain, mild erythema with pain on palpation of right calf. Xrays reviewed, small right knee effusion and soft tissue swelling around ankle, no evidence of acute fracture. US negative for DVT, significant for hypoechoic lesion likely hematoma vs cyst, unlikely to fully explain pain  -No leukocytosis however ESR and CRP noted to be elevated, with reported history of night sweats.  - Low suspicion for infectious etiology,  given afebrile, normal WBC. Unlikely septic joint given full ROM of knee and ankle with mild pain. Will hold off on abx. Check procalcitonin   -Concern for inflammatory arthropathy given h/o PMR however unlikely acute flare of polymyalgia rheumatica as it very rarely affects feet or ankles.   -Follow up uric acid, QUENTIN, rheumatoid factor, CCP, CK   -Pt s/p medrol pack with mild improvement, may require extended taper. Will trial on naproxen 500 mg BID for now given normal renal function, no bleeding risks. Percocet as needed for severe breakthrough pain   -Rheumatology c/s in AM. Consider sampling knee or ankle effusion if large enough  -PT consult given impaired mobility 2/2 to pain

## 2019-06-27 NOTE — ED PROVIDER NOTE - ATTENDING CONTRIBUTION TO CARE
73 yr old male with hx of PMR, prostate CA, smoker presents with 10 days of right leg pain.  Was admitted to [facility] for diaphoresis, right ankle swelling.  Wife who is an RN states a possible facial droop was appreciated and pt was admitted for CVA workup which was negative.  On 18th went to see podiatry for ankle pain and was started on medrol dose pack.  Worsening until this Monday where he went to podiatrist and got a cortisone injection into ankle.  Since then has now developed knee swelling.  Able to move joints but painful.    PE: well appearing; VSS; rhonchi lower bases right lung; s1 s2 no m/r/g abd soft/NT/ND ext: right knee swollen mildly warm; FROM; calf: somewhat tender; ankle; swollen FROM; no erythema Vasc: palpable pulses b/l    Imp: recent flight to [place] which proceeded all of this; concern for DVT which would explain leg findings; US, FROM of both joints (with pain) unlikely to be septic joint based on exam; ESR CRP blood cultures sent nonetheless; can not ambulate; likely will necessitate admission; rhochi on exam in long term smoker; CXR to r/o PNA/malignancy

## 2019-06-27 NOTE — H&P ADULT - NSHPLABSRESULTS_GEN_ALL_CORE
(06-27 @ 19:56)                      11.7  5.77 )-----------( 261                 34.9    Neutrophils = 3.96 (68.6%)  Lymphocytes = 0.46 (8.0%)  Eosinophils = 0.01 (0.2%)  Basophils = 0.03 (0.5%)  Monocytes = 0.90 (15.6%)  Bands = 7.0%    06-27    132<L>  |  97<L>  |  22  ----------------------------<  100<H>  5.2   |  21<L>  |  1.00    Ca    10.0      27 Jun 2019 19:56    TPro  7.3  /  Alb  3.2<L>  /  TBili  0.5  /  DBili  x   /  AST  29  /  ALT  35  /  AlkPhos  109  06-27    ( 27 Jun 2019 19:56 )   PT: 14.9 SEC;   INR: 1.33 ;       PTT:29.4 SEC      Venous Blood Gas:  06-27 @ 19:56  7.47/37/< 24/26/42.3  VBG Lactate: 2.0

## 2019-06-27 NOTE — ED PROVIDER NOTE - PHYSICAL EXAMINATION
General appearance: NAD, conversant, afebrile    Eyes: anicteric sclerae, moist conjunctivae; no lid-lag; Extraocular muscles intact   HENT: Atraumatic; NC   Neck: Trachea midline; Full range of motion, supple   Pulm: Lungs with rhonchi in RLL. Normal respiratory effort.   CV: Regular Rhythm and Rate; Normal S1, S2; No murmurs, rubs, or gallops. 2+ peripheral pulses.   Abdomen: Soft, non-tender, non-distended; no masses or hepatosplenomegaly.   Extremities: RLE: +pitting edema to the mid-shin. +Mildly increased warmth R ankle compared to L. PT pulses palpable on R but diminished likely secondary to swelling of the leg. No erythema noted. Patient is able to ROM his ankle but limited 2/2 swelling. +Calf TTP   Skin: Normal turgor and texture; no rash, ulcers or subcutaneous nodules   Psych: Appropriate affect, cooperative; alert and oriented to person, place and time

## 2019-06-27 NOTE — H&P ADULT - ASSESSMENT
73 year old M PMH Polymyalgia rheumatica (inactive for several years), Prostate Ca s/p prostatectomy, HLD presents with progressive RLE swelling and pain a/w inability to ambulate

## 2019-06-27 NOTE — H&P ADULT - NSHPSOCIALHISTORY_GEN_ALL_CORE
Smoking 8-10 cigarettes daily for past ~40 years. Social drinker, has 1 beer nightly   Lives with wife

## 2019-06-27 NOTE — H&P ADULT - HISTORY OF PRESENT ILLNESS
73 year old M PMH PMR (inactive for several years), Prostate Ca s/p prostatectomy presents with ten day hx of progressive RLE swelling. 73 year old M PMH PMR (inactive for several years), Prostate Ca s/p prostatectomy presents with progressive RLE swelling and pain. Patient reports onset of right ankle swelling and pain 6/17. He was treated with a course of prednisone without relief. He returned this week Monday for a cortisone injection in the ankle. Since then states pain has intensified and he is unable to walk without extreme pain in the right knee, calf and ankle. Now requires assistance walking with a walker when he was previously completely independent. Denies fevers, chills, 73 year old M PMH Polymyalgia rheumatica (inactive for several years), Prostate Ca s/p prostatectomy, HLD presents with progressive RLE swelling and pain. Patient reports onset of right ankle swelling and pain 6/17 followed by pain in the right calf and right knee. He was treated with a medrol dose pack without relief. He returned this week Monday for a cortisone injection in the ankle by podiatry. Since then states pain has intensified and he is unable to walk without assistance of a walker. Denies fevers, chills however wife notes new night sweats during which patient will wake up drenched in sweat. Denies weight loss, no rashes, abdominal pain, N/V, diarrhea or myalgias. Notes mild chest pain only when taking a deep breath which he reports started after likely straining a muscle while trying to ambulate, denies shortness of breath, cough or exertional chest pain.  In the ED, Tmax: 98.2  HR 82 - 93 BP: 105/55 - 109/54 RR: 17 - 18 SpO2: 99% - 100%. Pt had an US of the RLE which demonstrated a 4 cm hypoechoic lesion in the right calf near the right posterior tibial vein. Xray of the right leg significant for no acute fracture or dislocation, preserved Joint spaces, Small knee joint effusion. Small plantar calcaneal enthesophyte with mild soft tissue swelling noted around the ankle.

## 2019-06-27 NOTE — H&P ADULT - NSHPPHYSICALEXAM_GEN_ALL_CORE
GENERAL APPEARANCE: Well developed, well nourished, alert and cooperative. NAD.   HEENT:  PERRL, EOMI. External auditory canals normal, hearing grossly intact.  NECK: Neck supple, non-tender without lymphadenopathy, masses or thyromegaly.  CARDIAC: Normal S1 and S2. No S3, S4 or murmurs. Rhythm is regular.  LUNGS: Clear to auscultation and percussion without rales, rhonchi, wheezing or diminished breath sounds.  ABDOMEN: Positive bowel sounds. Soft, nondistended, nontender. No guarding or rebound.   MUSKULOSKELETAL: ROM intact spine and extremities. Small palpable effusion of right knee, increased warmth compared to left knee, no erythema. Pain on palpation of gastrocnemius. Mild erythema, warmth and swelling of right ankle.   BACK: normal gait and posture, no spinal deformity, symmetry of spinal muscles, without tenderness, decreased range of motion.  EXTREMITIES: +1 edema of RLE Peripheral pulses intact. No varicosities.  NEUROLOGICAL: CN II-XII intact. Strength and sensation symmetric and intact throughout.   SKIN: Skin normal color, texture and turgor with no lesions or eruptions.  PSYCHIATRIC: AOx3.Normal affect and behavior. GENERAL APPEARANCE: Well developed, well nourished, alert and cooperative. NAD.   HEENT:  PERRL, EOMI. External auditory canals normal, hearing grossly intact.  NECK: Neck supple, non-tender without lymphadenopathy, masses or thyromegaly.  CARDIAC: Normal S1 and S2. No S3, S4 or murmurs. Rhythm is regular.  LUNGS: Clear to auscultation and percussion without rales, rhonchi, wheezing or diminished breath sounds.  ABDOMEN: Positive bowel sounds. Soft, nondistended, nontender. No guarding or rebound.   MUSKULOSKELETAL: ROM intact spine and extremities. Small palpable effusion of right knee, increased warmth compared to left knee, no erythema. Pain on palpation along entire calf. Mild erythema, warmth and swelling of right ankle.   BACK: normal gait and posture, no spinal deformity, symmetry of spinal muscles, without tenderness, decreased range of motion.  EXTREMITIES: +1 edema of RLE Peripheral pulses intact. No varicosities.  NEUROLOGICAL: CN II-XII intact. Strength and sensation symmetric and intact throughout.   SKIN: Skin normal color, texture and turgor with no lesions or eruptions.  PSYCHIATRIC: AOx3.Normal affect and behavior.

## 2019-06-27 NOTE — ED ADULT TRIAGE NOTE - CHIEF COMPLAINT QUOTE
Patient brought to ER from home by EMS for c/o difficulty walking. Right leg and knee swelling. had a Cortisone shot in right foot and has not been able to walk since then.

## 2019-06-28 DIAGNOSIS — M79.89 OTHER SPECIFIED SOFT TISSUE DISORDERS: ICD-10-CM

## 2019-06-28 DIAGNOSIS — Z71.89 OTHER SPECIFIED COUNSELING: ICD-10-CM

## 2019-06-28 DIAGNOSIS — E78.5 HYPERLIPIDEMIA, UNSPECIFIED: ICD-10-CM

## 2019-06-28 DIAGNOSIS — C61 MALIGNANT NEOPLASM OF PROSTATE: ICD-10-CM

## 2019-06-28 DIAGNOSIS — D64.9 ANEMIA, UNSPECIFIED: ICD-10-CM

## 2019-06-28 DIAGNOSIS — Z29.9 ENCOUNTER FOR PROPHYLACTIC MEASURES, UNSPECIFIED: ICD-10-CM

## 2019-06-28 LAB
ANION GAP SERPL CALC-SCNC: 14 MMO/L — SIGNIFICANT CHANGE UP (ref 7–14)
BLD GP AB SCN SERPL QL: NEGATIVE — SIGNIFICANT CHANGE UP
BODY FLUID TYPE: SIGNIFICANT CHANGE UP
BODY FLUID TYPE: SIGNIFICANT CHANGE UP
BUN SERPL-MCNC: 25 MG/DL — HIGH (ref 7–23)
CALCIUM SERPL-MCNC: 9 MG/DL — SIGNIFICANT CHANGE UP (ref 8.4–10.5)
CHLORIDE SERPL-SCNC: 97 MMOL/L — LOW (ref 98–107)
CHOLEST SERPL-MCNC: 89 MG/DL — LOW (ref 120–199)
CHOLEST SERPL-MCNC: 93 MG/DL — LOW (ref 120–199)
CLARITY SPEC: SIGNIFICANT CHANGE UP
CLARITY SPEC: SIGNIFICANT CHANGE UP
CO2 SERPL-SCNC: 24 MMOL/L — SIGNIFICANT CHANGE UP (ref 22–31)
COLOR FLD: SIGNIFICANT CHANGE UP
COLOR FLD: SIGNIFICANT CHANGE UP
CREAT SERPL-MCNC: 1.1 MG/DL — SIGNIFICANT CHANGE UP (ref 0.5–1.3)
CRYSTALS FLD MICRO: SIGNIFICANT CHANGE UP
CRYSTALS FLD MICRO: SIGNIFICANT CHANGE UP
GLUCOSE SERPL-MCNC: 112 MG/DL — HIGH (ref 70–99)
GRAM STN WND: SIGNIFICANT CHANGE UP
GRAM STN WND: SIGNIFICANT CHANGE UP
HBA1C BLD-MCNC: 6 % — HIGH (ref 4–5.6)
HCT VFR BLD CALC: 28.1 % — LOW (ref 39–50)
HCT VFR BLD CALC: 31 % — LOW (ref 39–50)
HDLC SERPL-MCNC: 36 MG/DL — SIGNIFICANT CHANGE UP (ref 35–55)
HDLC SERPL-MCNC: 37 MG/DL — SIGNIFICANT CHANGE UP (ref 35–55)
HGB BLD-MCNC: 10.4 G/DL — LOW (ref 13–17)
HGB BLD-MCNC: 9.1 G/DL — LOW (ref 13–17)
LIPID PNL WITH DIRECT LDL SERPL: 44 MG/DL — SIGNIFICANT CHANGE UP
LIPID PNL WITH DIRECT LDL SERPL: 46 MG/DL — SIGNIFICANT CHANGE UP
LYMPHOCYTES NFR FLD: 13 % — SIGNIFICANT CHANGE UP
LYMPHOCYTES NFR FLD: 3 % — SIGNIFICANT CHANGE UP
MAGNESIUM SERPL-MCNC: 2.2 MG/DL — SIGNIFICANT CHANGE UP (ref 1.6–2.6)
MCHC RBC-ENTMCNC: 31.3 PG — SIGNIFICANT CHANGE UP (ref 27–34)
MCHC RBC-ENTMCNC: 32.4 % — SIGNIFICANT CHANGE UP (ref 32–36)
MCHC RBC-ENTMCNC: 32.5 PG — SIGNIFICANT CHANGE UP (ref 27–34)
MCHC RBC-ENTMCNC: 33.5 % — SIGNIFICANT CHANGE UP (ref 32–36)
MCV RBC AUTO: 96.6 FL — SIGNIFICANT CHANGE UP (ref 80–100)
MCV RBC AUTO: 96.9 FL — SIGNIFICANT CHANGE UP (ref 80–100)
MONOCYTES # FLD: 30 % — SIGNIFICANT CHANGE UP
MONOCYTES # FLD: 4 % — SIGNIFICANT CHANGE UP
NEUTS SEG NFR FLD MANUAL: 57 % — SIGNIFICANT CHANGE UP
NEUTS SEG NFR FLD MANUAL: 93 % — SIGNIFICANT CHANGE UP
NRBC # FLD: 0 K/UL — SIGNIFICANT CHANGE UP (ref 0–0)
NRBC # FLD: 0 K/UL — SIGNIFICANT CHANGE UP (ref 0–0)
PHOSPHATE SERPL-MCNC: 3.1 MG/DL — SIGNIFICANT CHANGE UP (ref 2.5–4.5)
PLATELET # BLD AUTO: 219 K/UL — SIGNIFICANT CHANGE UP (ref 150–400)
PLATELET # BLD AUTO: 220 K/UL — SIGNIFICANT CHANGE UP (ref 150–400)
PMV BLD: 8.7 FL — SIGNIFICANT CHANGE UP (ref 7–13)
PMV BLD: 8.9 FL — SIGNIFICANT CHANGE UP (ref 7–13)
POTASSIUM SERPL-MCNC: 4.4 MMOL/L — SIGNIFICANT CHANGE UP (ref 3.5–5.3)
POTASSIUM SERPL-SCNC: 4.4 MMOL/L — SIGNIFICANT CHANGE UP (ref 3.5–5.3)
PROCALCITONIN SERPL-MCNC: 1.65 NG/ML — HIGH (ref 0.02–0.1)
RBC # BLD: 2.91 M/UL — LOW (ref 4.2–5.8)
RBC # BLD: 3.2 M/UL — LOW (ref 4.2–5.8)
RBC # FLD: 14.6 % — HIGH (ref 10.3–14.5)
RBC # FLD: 14.6 % — HIGH (ref 10.3–14.5)
RCV VOL RI: HIGH CELL/UL (ref 0–5)
RCV VOL RI: HIGH CELL/UL (ref 0–5)
RH IG SCN BLD-IMP: NEGATIVE — SIGNIFICANT CHANGE UP
RHEUMATOID FACT SERPL-ACNC: 23 IU/ML — HIGH (ref 0–13)
SODIUM SERPL-SCNC: 135 MMOL/L — SIGNIFICANT CHANGE UP (ref 135–145)
SPECIMEN SOURCE: SIGNIFICANT CHANGE UP
TOTAL CELLS COUNTED, BODY FLUID: 100 CELLS — SIGNIFICANT CHANGE UP
TOTAL CELLS COUNTED, BODY FLUID: 100 CELLS — SIGNIFICANT CHANGE UP
TOTAL NUCLEATED CELL COUNT, BODY FLUID: 5888 CELL/UL — HIGH (ref 0–5)
TOTAL NUCLEATED CELL COUNT, BODY FLUID: HIGH CELL/UL (ref 0–5)
TRIGL SERPL-MCNC: 75 MG/DL — SIGNIFICANT CHANGE UP (ref 10–149)
TRIGL SERPL-MCNC: 76 MG/DL — SIGNIFICANT CHANGE UP (ref 10–149)
TSH SERPL-MCNC: 4.37 UIU/ML — HIGH (ref 0.27–4.2)
TSH SERPL-MCNC: 4.42 UIU/ML — HIGH (ref 0.27–4.2)
URATE SERPL-MCNC: 4.2 MG/DL — SIGNIFICANT CHANGE UP (ref 3.4–8.8)
WBC # BLD: 5.56 K/UL — SIGNIFICANT CHANGE UP (ref 3.8–10.5)
WBC # BLD: 5.6 K/UL — SIGNIFICANT CHANGE UP (ref 3.8–10.5)
WBC # FLD AUTO: 5.56 K/UL — SIGNIFICANT CHANGE UP (ref 3.8–10.5)
WBC # FLD AUTO: 5.6 K/UL — SIGNIFICANT CHANGE UP (ref 3.8–10.5)

## 2019-06-28 PROCEDURE — 99223 1ST HOSP IP/OBS HIGH 75: CPT | Mod: GC,25

## 2019-06-28 PROCEDURE — 99222 1ST HOSP IP/OBS MODERATE 55: CPT

## 2019-06-28 PROCEDURE — 99233 SBSQ HOSP IP/OBS HIGH 50: CPT

## 2019-06-28 PROCEDURE — 20610 DRAIN/INJ JOINT/BURSA W/O US: CPT | Mod: GC

## 2019-06-28 PROCEDURE — 93010 ELECTROCARDIOGRAM REPORT: CPT

## 2019-06-28 PROCEDURE — 20605 DRAIN/INJ JOINT/BURSA W/O US: CPT | Mod: GC

## 2019-06-28 RX ORDER — SENNA PLUS 8.6 MG/1
2 TABLET ORAL AT BEDTIME
Refills: 0 | Status: DISCONTINUED | OUTPATIENT
Start: 2019-06-28 | End: 2019-07-03

## 2019-06-28 RX ORDER — LIDOCAINE HCL 20 MG/ML
2 VIAL (ML) INJECTION ONCE
Refills: 0 | Status: DISCONTINUED | OUTPATIENT
Start: 2019-06-28 | End: 2019-07-03

## 2019-06-28 RX ORDER — SUCRALFATE 1 G
1 TABLET ORAL
Refills: 0 | Status: DISCONTINUED | OUTPATIENT
Start: 2019-06-28 | End: 2019-07-03

## 2019-06-28 RX ORDER — ENOXAPARIN SODIUM 100 MG/ML
40 INJECTION SUBCUTANEOUS DAILY
Refills: 0 | Status: DISCONTINUED | OUTPATIENT
Start: 2019-06-28 | End: 2019-07-03

## 2019-06-28 RX ORDER — LIDOCAINE 4 G/100G
2 CREAM TOPICAL DAILY
Refills: 0 | Status: DISCONTINUED | OUTPATIENT
Start: 2019-06-28 | End: 2019-07-03

## 2019-06-28 RX ORDER — PANTOPRAZOLE SODIUM 20 MG/1
40 TABLET, DELAYED RELEASE ORAL
Refills: 0 | Status: DISCONTINUED | OUTPATIENT
Start: 2019-06-28 | End: 2019-06-28

## 2019-06-28 RX ORDER — DOCUSATE SODIUM 100 MG
100 CAPSULE ORAL THREE TIMES A DAY
Refills: 0 | Status: DISCONTINUED | OUTPATIENT
Start: 2019-06-28 | End: 2019-07-03

## 2019-06-28 RX ORDER — OXYCODONE AND ACETAMINOPHEN 5; 325 MG/1; MG/1
1 TABLET ORAL EVERY 4 HOURS
Refills: 0 | Status: DISCONTINUED | OUTPATIENT
Start: 2019-06-28 | End: 2019-06-28

## 2019-06-28 RX ORDER — ATORVASTATIN CALCIUM 80 MG/1
40 TABLET, FILM COATED ORAL AT BEDTIME
Refills: 0 | Status: DISCONTINUED | OUTPATIENT
Start: 2019-06-28 | End: 2019-07-03

## 2019-06-28 RX ORDER — PANTOPRAZOLE SODIUM 20 MG/1
40 TABLET, DELAYED RELEASE ORAL
Refills: 0 | Status: DISCONTINUED | OUTPATIENT
Start: 2019-06-28 | End: 2019-07-03

## 2019-06-28 RX ORDER — LIDOCAINE HCL 20 MG/ML
1 VIAL (ML) INJECTION ONCE
Refills: 0 | Status: DISCONTINUED | OUTPATIENT
Start: 2019-06-28 | End: 2019-07-03

## 2019-06-28 RX ORDER — ROSUVASTATIN CALCIUM 5 MG/1
1 TABLET ORAL
Qty: 0 | Refills: 0 | DISCHARGE

## 2019-06-28 RX ORDER — ACETAMINOPHEN 500 MG
650 TABLET ORAL EVERY 6 HOURS
Refills: 0 | Status: DISCONTINUED | OUTPATIENT
Start: 2019-06-28 | End: 2019-07-03

## 2019-06-28 RX ORDER — SODIUM CHLORIDE 9 MG/ML
1000 INJECTION, SOLUTION INTRAVENOUS
Refills: 0 | Status: DISCONTINUED | OUTPATIENT
Start: 2019-06-28 | End: 2019-07-03

## 2019-06-28 RX ORDER — POLYETHYLENE GLYCOL 3350 17 G/17G
17 POWDER, FOR SOLUTION ORAL DAILY
Refills: 0 | Status: DISCONTINUED | OUTPATIENT
Start: 2019-06-28 | End: 2019-07-03

## 2019-06-28 RX ORDER — ETHYL CHLORIDE
1 AEROSOL, SPRAY (ML) TOPICAL ONCE
Refills: 0 | Status: DISCONTINUED | OUTPATIENT
Start: 2019-06-28 | End: 2019-07-03

## 2019-06-28 RX ORDER — TRAMADOL HYDROCHLORIDE 50 MG/1
100 TABLET ORAL EVERY 6 HOURS
Refills: 0 | Status: DISCONTINUED | OUTPATIENT
Start: 2019-06-28 | End: 2019-07-03

## 2019-06-28 RX ADMIN — Medication 100 MILLIGRAM(S): at 12:19

## 2019-06-28 RX ADMIN — OXYCODONE AND ACETAMINOPHEN 1 TABLET(S): 5; 325 TABLET ORAL at 01:44

## 2019-06-28 RX ADMIN — PANTOPRAZOLE SODIUM 40 MILLIGRAM(S): 20 TABLET, DELAYED RELEASE ORAL at 17:24

## 2019-06-28 RX ADMIN — SENNA PLUS 2 TABLET(S): 8.6 TABLET ORAL at 22:25

## 2019-06-28 RX ADMIN — SODIUM CHLORIDE 50 MILLILITER(S): 9 INJECTION, SOLUTION INTRAVENOUS at 22:24

## 2019-06-28 RX ADMIN — Medication 500 MILLIGRAM(S): at 05:26

## 2019-06-28 RX ADMIN — OXYCODONE AND ACETAMINOPHEN 1 TABLET(S): 5; 325 TABLET ORAL at 07:10

## 2019-06-28 RX ADMIN — TRAMADOL HYDROCHLORIDE 100 MILLIGRAM(S): 50 TABLET ORAL at 15:57

## 2019-06-28 RX ADMIN — Medication 100 MILLIGRAM(S): at 22:25

## 2019-06-28 RX ADMIN — TRAMADOL HYDROCHLORIDE 100 MILLIGRAM(S): 50 TABLET ORAL at 23:25

## 2019-06-28 RX ADMIN — SODIUM CHLORIDE 50 MILLILITER(S): 9 INJECTION, SOLUTION INTRAVENOUS at 18:56

## 2019-06-28 RX ADMIN — Medication 1 GRAM(S): at 17:24

## 2019-06-28 RX ADMIN — TRAMADOL HYDROCHLORIDE 100 MILLIGRAM(S): 50 TABLET ORAL at 22:25

## 2019-06-28 RX ADMIN — TRAMADOL HYDROCHLORIDE 100 MILLIGRAM(S): 50 TABLET ORAL at 15:31

## 2019-06-28 RX ADMIN — ENOXAPARIN SODIUM 40 MILLIGRAM(S): 100 INJECTION SUBCUTANEOUS at 12:19

## 2019-06-28 RX ADMIN — OXYCODONE AND ACETAMINOPHEN 1 TABLET(S): 5; 325 TABLET ORAL at 02:40

## 2019-06-28 RX ADMIN — LIDOCAINE 2 PATCH: 4 CREAM TOPICAL at 22:24

## 2019-06-28 RX ADMIN — OXYCODONE AND ACETAMINOPHEN 1 TABLET(S): 5; 325 TABLET ORAL at 06:40

## 2019-06-28 NOTE — PHYSICAL THERAPY INITIAL EVALUATION ADULT - ADDITIONAL COMMENTS
lives with wife in a home with steps to enter; no Assistive Device prior to 2 weeks ago but has been using rolling walker due to Right Lower Extremity pain and now bilateral Lower Extremity pain

## 2019-06-28 NOTE — CONSULT NOTE ADULT - ASSESSMENT
1) Leg pain        2) Bandemia        3) Chills and sweats &3 year old with history of PMR and prostate cancer presents with right lower extremity pain.  He has associated arthralgias.     He does not appear toxic.     My concern for infection is not high but elevated bands and procalcitonin are concerning    1) Abnormal imaging of the right leg    Consider MRI of the lag      2) Bandemia with elevated procalcitonin    Hard to interpret in absence of more focal exam and abscence of fever    Check blood culture  Repeat CBC with differential  Repeat procalcitonin  Can check lyme serology    3) Joint pain  Polyarticular  Check lyme  Check parvovirus pcr  Consider rheum eval - h/o PMR    Observe off antimicrobials

## 2019-06-28 NOTE — CONSULT NOTE ADULT - PROBLEM SELECTOR RECOMMENDATION 9
- r/o anemia in setting of slow ugib 2/2 nsaids  - trend h/h; hgb deviating from baseline of 11-12's  - cont ppi/carafate prophylactically until ugib ruled out  - occult pending   - iron studies pending   - avoid nsaids   - keep stools soft with senna/colace and miralax prn   - regular diet   - NPO p MN on Sunday for EGD on Monday

## 2019-06-28 NOTE — PROGRESS NOTE ADULT - PROBLEM SELECTOR PLAN 1
- MRI tib/fib to evaluate post calf hematoma   -No leukocytosis however ESR and CRP noted to be elevated, with reported history of night sweats- ID called- unclear what to make of elevated procalcitonin  -Pt s/p medrol pack with no improvement- unclear indication  - wife requesting Tramadol for pain as oxycodone is "too strong"- Tylenol does not help  ?role for lidoderm patch   -Rheumatology consulted- doubt if PMR or other rheum cause contributing but will get opinion  -PT consult given impaired mobility 2/2 to pain  - PMR consulted as we  - D/w ortho- if +hematoma on MRI- need to call [facility] surgery - MRI tib/fib to evaluate post calf hematoma   -No leukocytosis however ESR and CRP noted to be elevated, with reported history of night sweats- ID called- unclear what to make of elevated procalcitonin  -Pt s/p medrol pack with no improvement- unclear indication  - wife requesting Tramadol for pain as oxycodone is "too strong"- Tylenol does not help  ?role for lidoderm patch   -Rheumatology consulted- doubt if PMR or other rheum cause contributing but will get opinion  -PT consult given impaired mobility 2/2 to pain  - PMR consulted as well  - D/w ortho- if +hematoma on MRI- need to call [facility] surgery  -called outpt podiatry- office closed at 1p- called at 2p- asked to call at 8a in am to obtain outpt MRI

## 2019-06-28 NOTE — CONSULT NOTE ADULT - ASSESSMENT
72 y/o M hx of remote supposed PMR, presenting w acute oligoarthritis  ddx: septic joints vs pseudogout vs RA  Pt had procedure done on Monday of R ankle. R ankle w restricted ROM. Synovial fluid cloudy. Septic joint should be r/o.   Pt uric acid low, so low probability of gout, given lack of risk factors. Pseudogout is a possibility.   Pt could have RA, given b/l symmetric knee involvement.   Bedside diagnostic arthrocentesis performed of R ankle, R knee; consent obtained after benefits and risk explained    Recommend  Please obtain CCP  F/u synovial fluid gram stain/cx, crystals, cell count of R ankle, R knee   Will dictate further tx according to results above  Will follow    Madan Thornton MD PGY4  [id number]

## 2019-06-28 NOTE — PROGRESS NOTE ADULT - ASSESSMENT
73 year old M PMH Polymyalgia rheumatica (inactive for several years), Prostate Ca s/p prostatectomy sees doctors regularly , HLD presented with progressive RLE swelling and pain a/w inability to ambulate- sono of leg w/ 4cm cyst vs hematoma in calf- d/w radiology ?hematoma- MRI tib/fib ordered;  pt w/ night sweats, elev CRP and procalcitonin- ID called  given elev BUN, steroids and nsaids recently, with anemia- GI called  ortho consulted but will see pt depending on MRI results  Rheum consulted as well for h/o PMR    Suspect rle pain/hematoma could be due to calf muscle strain from calf stretching exercises.

## 2019-06-28 NOTE — CHART NOTE - NSCHARTNOTEFT_GEN_A_CORE
Informed by nurse that patient was complaining of pain in left leg. Patient examined at bedside. Patient has pain on motion of left thigh. Patient left thigh is not as warm compared to right leg. No swelling noted in thigh,knee and ankle. Will continue to monitor.

## 2019-06-28 NOTE — CONSULT NOTE ADULT - SUBJECTIVE AND OBJECTIVE BOX
HPI:  73 year old M PMH Polymyalgia rheumatica (inactive for several years), Prostate Ca s/p prostatectomy, HLD presents with progressive RLE swelling and pain. Patient reports onset of right ankle swelling and pain 6/17 followed by pain in the right calf and right knee. He was treated with a medrol dose pack without relief. He returned this week Monday for a cortisone injection in the ankle by podiatry. Since then states pain has intensified and he is unable to walk without assistance of a walker. Denies fevers, chills however wife notes new night sweats during which patient will wake up drenched in sweat. Denies weight loss, no rashes, abdominal pain, N/V, diarrhea or myalgias. Notes mild chest pain only when taking a deep breath which he reports started after likely straining a muscle while trying to ambulate, denies shortness of breath, cough or exertional chest pain.  In the ED, Tmax: 98.2  HR 82 - 93 BP: 105/55 - 109/54 RR: 17 - 18 SpO2: 99% - 100%. Pt had an US of the RLE which demonstrated a 4 cm hypoechoic lesion in the right calf near the right posterior tibial vein. Xray of the right leg significant for no acute fracture or dislocation, preserved Joint spaces, Small knee joint effusion. Small plantar calcaneal enthesophyte with mild soft tissue swelling noted around the ankle. (27 Jun 2019 23:52)      PAST MEDICAL & SURGICAL HISTORY:  Polymyalgia rheumatica  Prostate cancer  H/O prostatectomy      Allergies    No Known Allergies    Intolerances        ANTIMICROBIALS:      OTHER MEDS:  acetaminophen   Tablet .. 650 milliGRAM(s) Oral every 6 hours PRN  atorvastatin 40 milliGRAM(s) Oral at bedtime  docusate sodium 100 milliGRAM(s) Oral three times a day  enoxaparin Injectable 40 milliGRAM(s) SubCutaneous daily  pantoprazole    Tablet 40 milliGRAM(s) Oral two times a day  polyethylene glycol 3350 17 Gram(s) Oral daily PRN  senna 2 Tablet(s) Oral at bedtime  sucralfate 1 Gram(s) Oral two times a day  traMADol 100 milliGRAM(s) Oral every 6 hours PRN      SOCIAL HISTORY:    FAMILY HISTORY:  FH: myocardial infarction: Dad age 40      REVIEW OF SYSTEMS  [  ] ROS unobtainable because:    [  ] All other systems negative except as noted below:	    Constitutional:  [ ] fever [ ] chills  [ ] weight loss  [ ] weakness  Skin:  [ ] rash [ ] phlebitis	  Eyes: [ ] icterus [ ] pain  [ ] discharge	  ENMT: [ ] sore throat  [ ] thrush [ ] ulcers [ ] exudates  Respiratory: [ ] dyspnea [ ] hemoptysis [ ] cough [ ] sputum	  Cardiovascular:  [ ] chest pain [ ] palpitations [ ] edema	  Gastrointestinal:  [ ] nausea [ ] vomiting [ ] diarrhea [ ] constipation [ ] pain	  Genitourinary:  [ ] dysuria [ ] frequency [ ] hematuria [ ] discharge [ ] flank pain  [ ] incontinence  Musculoskeletal:  [ ] myalgias [ ] arthralgias [ ] arthritis  [ ] back pain  Neurological:  [ ] headache [ ] seizures  [ ] confusion/altered mental status  Psychiatric:  [ ] anxiety [ ] depression	  Hematology/Lymphatics:  [ ] lymphadenopathy  Endocrine:  [ ] adrenal [ ] thyroid  Allergic/Immunologic:	 [ ] transplant [ ] seasonal    PHYSICAL EXAM:  General: [ ] non-toxic  HEAD/EYES: [ ] PERRL [ ] white sclera [ ] icterus  ENT:  [ ] normal [ ] supple [ ] thrush [ ] pharyngeal exudate  Cardiovascular:   [ ] murmur [ ] normal [ ] PPM/AICD  Respiratory:  [ ] clear to ausculation bilaterally  GI:  [ ] soft, non-tender, normal bowel sounds  :  [ ] hamlin [ ] no CVA tenderness   Musculoskeletal:  [ ] no synovitis  Neurologic:  [ ] non-focal exam   Skin:  [ ] no rash  Lymph: [ ] no lymphadenopathy  Psychiatric:  [ ] appropriate affect [ ] alert & oriented  Lines:  [ ] no phlebitis [ ] central line          Drug Dosing Weight  Height (cm): 175.26 (28 Jun 2019 00:47)  Weight (kg): 77.5 (28 Jun 2019 00:47)  BMI (kg/m2): 25.2 (28 Jun 2019 00:47)  BSA (m2): 1.93 (28 Jun 2019 00:47)    Vital Signs Last 24 Hrs  T(F): 98 (06-28-19 @ 12:09), Max: 98.2 (06-28-19 @ 00:47)    Vital Signs Last 24 Hrs  HR: 80 (06-28-19 @ 12:09) (80 - 93)  BP: 90/52 (06-28-19 @ 12:09) (90/52 - 109/54)  RR: 17 (06-28-19 @ 12:09)  SpO2: 100% (06-28-19 @ 12:09) (98% - 100%)  Wt(kg): --                          10.4   5.56  )-----------( 219      ( 28 Jun 2019 11:15 )             31.0       06-28    135  |  97<L>  |  25<H>  ----------------------------<  112<H>  4.4   |  24  |  1.10    Ca    9.0      28 Jun 2019 05:22  Phos  3.1     06-28  Mg     2.2     06-28    TPro  7.3  /  Alb  3.2<L>  /  TBili  0.5  /  DBili  x   /  AST  29  /  ALT  35  /  AlkPhos  109  06-27          MICROBIOLOGY:    RADIOLOGY: HPI:  73 year old M PMH of Polymyalgia rheumatica and Prostate Ca s/p prostatectomy,     The patient has been having right foot pain for weeks.  He was diagnosed with plantar fascitis.    He went on a cruise ot the [facility] [place] [place]     When he returned, he had worsening right ankle pain.      He has been treated with a medrol dose pack and a steroid injection- without relief.    His pain is worse with exertion.    His pain s[read up his right leg and involves his right knee.     He denies fever.     He did note night sweats for two nights.  He denies cough.  He has not had chills- but does state he frequently feels cold.    The pain has been worse and has been limiting activity.    He now complains of left knee pain and some left UE pain with rom at the shoulder.      PAST MEDICAL & SURGICAL HISTORY:  Polymyalgia rheumatica  Prostate cancer  H/O prostatectomy      Allergies    No Known Allergies    Intolerances        ANTIMICROBIALS:      OTHER MEDS:  acetaminophen   Tablet .. 650 milliGRAM(s) Oral every 6 hours PRN  atorvastatin 40 milliGRAM(s) Oral at bedtime  docusate sodium 100 milliGRAM(s) Oral three times a day  enoxaparin Injectable 40 milliGRAM(s) SubCutaneous daily  pantoprazole    Tablet 40 milliGRAM(s) Oral two times a day  polyethylene glycol 3350 17 Gram(s) Oral daily PRN  senna 2 Tablet(s) Oral at bedtime  sucralfate 1 Gram(s) Oral two times a day  traMADol 100 milliGRAM(s) Oral every 6 hours PRN      SOCIAL HISTORY:    retired  + Tobacco  Travel [facility]/ [facility]      FAMILY HISTORY:  FH: myocardial infarction: Dad age 40      REVIEW OF SYSTEMS  [  ] ROS unobtainable because:    [ x ] All other systems negative except as noted below:	    Constitutional:  [ ] fever [ ] chills  [ ] weight loss  [ ] weakness  Skin:  [ ] rash [ ] phlebitis	  Eyes: [ ] icterus [ ] pain  [ ] discharge	  ENMT: [ ] sore throat  [ ] thrush [ ] ulcers [ ] exudates  Respiratory: [ ] dyspnea [ ] hemoptysis [ ] cough [ ] sputum	  Cardiovascular:  [ ] chest pain [ ] palpitations [ ] edema	  Gastrointestinal:  [ ] nausea [ ] vomiting [ ] diarrhea [ ] constipation [ ] pain	  Genitourinary:  [ ] dysuria [ ] frequency [ ] hematuria [ ] discharge [ ] flank pain  [ ] incontinence  Musculoskeletal:  [x ] myalgias [x ] arthralgias [x ] arthritis  [ ] back pain  Neurological:  [ ] headache [ ] seizures  [ ] confusion/altered mental status  Psychiatric:  [ ] anxiety [ ] depression	  Hematology/Lymphatics:  [ ] lymphadenopathy  Endocrine:  [ ] adrenal [ ] thyroid  Allergic/Immunologic:	 [ ] transplant [ ] seasonal    PHYSICAL EXAM:  General: [x ] non-toxic  HEAD/EYES: [ ] PERRL [x ] white sclera [ ] icterus  ENT:  [ ] normal [ x] supple [ ] thrush [ ] pharyngeal exudate  Cardiovascular:   [ ] murmur [x ] normal [ ] PPM/AICD  Respiratory:  [x ] clear to ausculation bilaterally  GI:  [ x] soft, non-tender, normal bowel sounds  :  [ ] hamlin [x ] no CVA tenderness   Musculoskeletal:  [ x] pain with rom bilateral knnes R> L  pain with ROM right ankle  Pain with ROM left shoudler    Neurologic:  [x ] non-focal exam   Skin:  x[ ] no rash  Lymph: [x ] no lymphadenopathy  Psychiatric:  [x ] appropriate affect [ ] alert & oriented  Lines:  [x ] no phlebitis [ ] central line          Drug Dosing Weight  Height (cm): 175.26 (28 Jun 2019 00:47)  Weight (kg): 77.5 (28 Jun 2019 00:47)  BMI (kg/m2): 25.2 (28 Jun 2019 00:47)  BSA (m2): 1.93 (28 Jun 2019 00:47)    Vital Signs Last 24 Hrs  T(F): 98 (06-28-19 @ 12:09), Max: 98.2 (06-28-19 @ 00:47)    Vital Signs Last 24 Hrs  HR: 80 (06-28-19 @ 12:09) (80 - 93)  BP: 90/52 (06-28-19 @ 12:09) (90/52 - 109/54)  RR: 17 (06-28-19 @ 12:09)  SpO2: 100% (06-28-19 @ 12:09) (98% - 100%)  Wt(kg): --                          10.4   5.56  )-----------( 219      ( 28 Jun 2019 11:15 )             31.0       06-28    135  |  97<L>  |  25<H>  ----------------------------<  112<H>  4.4   |  24  |  1.10    Ca    9.0      28 Jun 2019 05:22  Phos  3.1     06-28  Mg     2.2     06-28    TPro  7.3  /  Alb  3.2<L>  /  TBili  0.5  /  DBili  x   /  AST  29  /  ALT  35  /  AlkPhos  109  06-27          MICROBIOLOGY:    RADIOLOGY:  < from: US Duplex Venous Lower Ext Ltd, Right (06.27.19 @ 21:36) >  IMPRESSION:   No evidence of right lower extremity deep venous thrombosis.    A 4 cm hypoechoic lesion in the right calf near the right posterior   tibial vein. Differential includes hematoma versus cyst. Clinically   correlate.    < end of copied text >

## 2019-06-28 NOTE — CONSULT NOTE ADULT - SUBJECTIVE AND OBJECTIVE BOX
Chief Complaint:  Patient is a 73y old  Male who presents with a chief complaint of RLE pain (28 Jun 2019 12:43)    Polymyalgia rheumatica  Prostate cancer  H/O prostatectomy     HPI:  73 year old M PMH Polymyalgia rheumatica (inactive for several years), Prostate Ca s/p prostatectomy, HLD presents with progressive RLE swelling and pain. Patient reports onset of right ankle swelling and pain 6/17 followed by pain in the right calf and right knee. He was treated with a medrol dose pack without relief. He returned this week Monday for a cortisone injection in the ankle by podiatry. Since then states pain has intensified and he is unable to walk without assistance of a walker. Denies fevers, chills however wife notes new night sweats during which patient will wake up drenched in sweat. Denies weight loss, no rashes, abdominal pain, N/V, diarrhea or myalgias. Notes mild chest pain only when taking a deep breath which he reports started after likely straining a muscle while trying to ambulate, denies shortness of breath, cough or exertional chest pain. In the ED, Tmax: 98.2  HR 82 - 93 BP: 105/55 - 109/54 RR: 17 - 18 SpO2: 99% - 100%. Pt had an US of the RLE which demonstrated a 4 cm hypoechoic lesion in the right calf near the right posterior tibial vein. Xray of the right leg significant for no acute fracture or dislocation, preserved Joint spaces, Small knee joint effusion. Small plantar calcaneal enthesophyte with mild soft tissue swelling noted around the ankle. GI consulted for new onset anemia. The patient is seen with his daughter and wife bedside. The patient has never been told he was anemic in the past with recent blood work notable for normal Hgb in 12's and on admission day, Hgb 11.7. The patient reports that he has been with increased knee pain for which he has been taking tramadol and some motrin at home as per his wife who appears to manage his medications. He has no blood thinners at home. He reports no abdominal pain, nausea or vomiting. He had a bm yesterday after taking milk of magnesia that he did not look at the color, however, his wife reports she noticed the color and it appeared to be brown and without melena or hematochezia. GI called when Hgb 9.1 with repeat now 10.4. He has had normal colonoscopy in the past. Patient reports no abdominal pain, no nausea/vomiting, no melena/hematochezia, no hematemesis, no constipation/diarrhea, no weight loss or appetite changes, no bowel habit changes, no dysphagia/odynophagia, no fever/chills.    No Known Allergies      acetaminophen   Tablet .. 650 milliGRAM(s) Oral every 6 hours PRN  atorvastatin 40 milliGRAM(s) Oral at bedtime  docusate sodium 100 milliGRAM(s) Oral three times a day  enoxaparin Injectable 40 milliGRAM(s) SubCutaneous daily  pantoprazole    Tablet 40 milliGRAM(s) Oral two times a day  polyethylene glycol 3350 17 Gram(s) Oral daily PRN  senna 2 Tablet(s) Oral at bedtime  sucralfate 1 Gram(s) Oral two times a day  traMADol 100 milliGRAM(s) Oral every 6 hours PRN        FAMILY HISTORY:  FH: myocardial infarction: Dad age 40        Review of Systems:    General:  No wt loss, fevers, chills, night sweats, fatigue  Eyes:  Good vision, no reported pain  ENT:  No sore throat, pain, runny nose, dysphagia  CV:  No pain, palpitations, no lightheadedness  Resp:  No dyspnea, cough, tachypnea, wheezing  GI: denies n/v/d/c, abdominal pain, melena or brbpr   :  No pain, bleeding, incontinence, nocturia  Muscle:  No pain, weakness  Neuro:  No weakness, tingling, memory problems  Psych:  No fatigue, insomnia, mood problems, depression  Endocrine:  No polyuria, polydypsia, cold/heat intolerance  Heme:  No petechiae, ecchymosis, easy bruisability  Skin:  No rash, tattoos, scars, edema    Relevant Family History:   n/c    Relevant Social History: n/c      Physical Exam:    Vital Signs:  Vital Signs Last 24 Hrs  T(C): 36.7 (28 Jun 2019 12:09), Max: 36.8 (28 Jun 2019 00:47)  T(F): 98 (28 Jun 2019 12:09), Max: 98.2 (28 Jun 2019 00:47)  HR: 80 (28 Jun 2019 12:09) (80 - 93)  BP: 90/52 (28 Jun 2019 12:09) (90/52 - 109/54)  BP(mean): --  RR: 17 (28 Jun 2019 12:09) (17 - 18)  SpO2: 100% (28 Jun 2019 12:09) (98% - 100%)  Daily Height in cm: 175.26 (28 Jun 2019 00:47)    Daily     General:  Appears stated age, well-groomed, nad  HEENT:  NC/AT,  conjunctivae clear and pink, no thyromegaly, nodules, adenopathy, no JVD  Chest:  Full & symmetric excursion, no increased effort, breath sounds clear  Cardiovascular:  Regular rhythm, S1, S2, no murmur/rub/S3/S4, no abdominal bruit, no edema  Abdomen:  Soft, non-tender, non-distended, normoactive bowel sounds,  no masses ,no hepatosplenomeagaly, no signs of chronic liver disease  Extremities:  no cyanosis,clubbing or edema  Skin:  No rash/erythema/ecchymoses/petechiae/wounds/abscess/warm/dry  Neuro/Psych:  A&Ox3  , no asterixis, no tremor, no encephalopathy    Laboratory:                            10.4   5.56  )-----------( 219      ( 28 Jun 2019 11:15 )             31.0     06-28    135  |  97<L>  |  25<H>  ----------------------------<  112<H>  4.4   |  24  |  1.10    Ca    9.0      28 Jun 2019 05:22  Phos  3.1     06-28  Mg     2.2     06-28    TPro  7.3  /  Alb  3.2<L>  /  TBili  0.5  /  DBili  x   /  AST  29  /  ALT  35  /  AlkPhos  109  06-27    LIVER FUNCTIONS - ( 27 Jun 2019 19:56 )  Alb: 3.2 g/dL / Pro: 7.3 g/dL / ALK PHOS: 109 u/L / ALT: 35 u/L / AST: 29 u/L / GGT: x           PT/INR - ( 27 Jun 2019 19:56 )   PT: 14.9 SEC;   INR: 1.33          PTT - ( 27 Jun 2019 19:56 )  PTT:29.4 SEC      Imaging:

## 2019-06-28 NOTE — PROGRESS NOTE ADULT - SUBJECTIVE AND OBJECTIVE BOX
Dr. Fitzpatrick pager [phone]    Patient is a 73y old  Male who presents with a chief complaint of RLE pain (28 Jun 2019 15:23)      SUBJECTIVE / OVERNIGHT EVENTS: pt seen and examined at [facility] at bedside along with wife  history reviewed- patient was having foot pain from plantar fascitis in May- was doing the exercises- went on a trip- in June pain was getting worse- was doing the towel stretches and other exercises for plantar fascitis - noticed that pain was going up in to ankle- went to see his podiatrist on 6/17- got medrol dosepak and local steroid injection- MRI done as outpt- pain cont to go up into right leg- had difficulty walking- had to use walker- could not weight bear- noticed night sweats X1 week- brought in by wife for RLE pain and difficulty walking  Note- pt was at [facility] for near syncope one week ago- neg MRI head and sent home  hgb then 11.8  xray ankle neg      MEDICATIONS  (STANDING):  atorvastatin 40 milliGRAM(s) Oral at bedtime  docusate sodium 100 milliGRAM(s) Oral three times a day  enoxaparin Injectable 40 milliGRAM(s) SubCutaneous daily  pantoprazole    Tablet 40 milliGRAM(s) Oral two times a day  senna 2 Tablet(s) Oral at bedtime  sucralfate 1 Gram(s) Oral two times a day    MEDICATIONS  (PRN):  acetaminophen   Tablet .. 650 milliGRAM(s) Oral every 6 hours PRN Mild Pain (1 - 3)  polyethylene glycol 3350 17 Gram(s) Oral daily PRN Constipation  traMADol 100 milliGRAM(s) Oral every 6 hours PRN moderate to severe pain      Meds ordered within last 24hours  oxyCODONE    5 mG/acetaminophen 325 mG: [Ordered as PERCOCET]  1 Tablet(s), Oral, Once, Stop After 1 Doses  Administration Instructions: ACETAMINOPHEN MAX DAILY DOSE:  ADULT = 4,000 mG/Day  This is a Look-alike/Sound-alike Medication  Provider's Contact #: [phone] (06-27 @ 21:53)  (Floorstock):   Qty Removed: 1 each (06-27 @ 22:04)  oxyCODONE    5 mG/acetaminophen 325 mG: [Ordered as PERCOCET]  1 Tablet(s), Oral, every 4 hours, PRN for Severe Pain (7 - 10)  Administration Instructions: ACETAMINOPHEN MAX DAILY DOSE:  ADULT = 4,000 mG/Day  This is a Look-alike/Sound-alike Medication  Provider's Contact #: [phone] (06-28 @ 01:34)  naproxen: [Ordered as NAPROSYN]  500 milliGRAM(s), Oral, two times a day, Stop After 5 Days  Provider's Contact #: [phone] (06-28 @ 01:52)  atorvastatin: [Known as LIPITOR]  40 milliGRAM(s), Oral, at bedtime (06-28 @ 01:52)  enoxaparin Injectable: [Ordered as LOVENOX]  40 milliGRAM(s), SubCutaneous, daily  Provider's Contact #: [phone] (06-28 @ 02:12)  pantoprazole    Tablet: [Ordered as PROTONIX   DR]  40 milliGRAM(s), Oral, before breakfast  Administration Instructions: swallow whole * don't crush/chew  Provider's Contact #: [phone] (06-28 @ 10:59)  traMADol: [Known as ULTRAM]  100 milliGRAM(s), Oral, every 6 hours, PRN for moderate to severe pain  Administration Instructions: This is a Look-alike/Sound-alike Medication  Provider's Contact #: [phone] (06-28 @ 10:59)  acetaminophen   Tablet ..: [Known as TYLENOL..]  650 milliGRAM(s), Oral, every 6 hours, PRN for Mild Pain (1 - 3)  Administration Instructions: MAX DAILY DOSE:  ADULT = 4,000 mG/Day (06-28 @ 10:59)  pantoprazole    Tablet: [Ordered as PROTONIX   DR]  40 milliGRAM(s), Oral, two times a day  Administration Instructions: swallow whole * don't crush/chew  Provider's Contact #: [phone] (06-28 @ 11:17)  sucralfate: [Ordered as CARAFATE]  1 Gram(s), Oral, two times a day  Provider's Contact #: [phone] (06-28 @ 11:17)  senna:   2 Tablet(s), Oral, at bedtime  Provider's Contact #: [phone] (06-28 @ 11:17)  docusate sodium: [Ordered as COLACE]  100 milliGRAM(s), Oral, three times a day  Provider's Contact #: [phone] (06-28 @ 11:17)  polyethylene glycol 3350: [Ordered as MIRALAX]  17 Gram(s), Oral, daily, PRN for Constipation  Administration Instructions: Dissolve in 8 ounces water, juice, cola or tea.  Provider's Contact #: [phone] (06-28 @ 11:17)      T(C): 36.7 (06-28-19 @ 12:09), Max: 36.8 (06-28-19 @ 00:47)  HR: 80 (06-28-19 @ 12:09) (80 - 93)  BP: 90/52 (06-28-19 @ 12:09) (90/52 - 109/54)  RR: 17 (06-28-19 @ 12:09) (17 - 18)  SpO2: 100% (06-28-19 @ 12:09) (98% - 100%)    CAPILLARY BLOOD GLUCOSE        I&O's Summary      PHYSICAL EXAM:  GENERAL: NAD  CHEST/LUNG: Clear to auscultation bilaterally; No wheeze  HEART: Regular rate and rhythm; No murmurs, rubs, or gallops  ABDOMEN: Soft, Nontender, Nondistended; Bowel sounds present  EXTREMITIES:  +1 RLE edema; +post calf tenderness  NEURO: had difficulty w SLR right leg - LLE 5/5; RLE 5-/5      LABS:                        10.4   5.56  )-----------( 219      ( 28 Jun 2019 11:15 )             31.0     06-28    135  |  97<L>  |  25<H>  ----------------------------<  112<H>  4.4   |  24  |  1.10    Ca    9.0      28 Jun 2019 05:22  Phos  3.1     06-28  Mg     2.2     06-28    TPro  7.3  /  Alb  3.2<L>  /  TBili  0.5  /  DBili  x   /  AST  29  /  ALT  35  /  AlkPhos  109  06-27    PT/INR - ( 27 Jun 2019 19:56 )   PT: 14.9 SEC;   INR: 1.33          PTT - ( 27 Jun 2019 19:56 )  PTT:29.4 SEC          RADIOLOGY & ADDITIONAL TESTS:    Imaging Personally Reviewed:    Consultant(s) Notes Reviewed:      Care Discussed with Consultants/Other Providers:

## 2019-06-28 NOTE — PHYSICAL THERAPY INITIAL EVALUATION ADULT - PERTINENT HX OF CURRENT PROBLEM, REHAB EVAL
73 year old M PMH Polymyalgia rheumatica (inactive for several years), Prostate Ca s/p prostatectomy, HLD presents with progressive RLE swelling and pain. Patient reports onset of right ankle swelling and pain 6/17 followed by pain in the right calf and right knee.

## 2019-06-28 NOTE — PATIENT PROFILE ADULT - NSTOBACCO QUIT READY_GEN_A_CORE_SD
[facility]  FOLLOW-UP VISIT NOTE  3/16/2018      PROBLEM LIST  1. CHRONIC low back pain - myofascial versus facet  2. Right hip pain - hip pathology versus radicular symptoms    SUBJECTIVE:  Patient is American-speaking.  A video , Ai, was used during the duration of the history acquisition, physical examination and plan of care.     This is a follow-up visit for the patient who was last seen on 2/8/18 for the above listed problems.    History: Mikhail Rosas is a 34 year old male presenting today for follow up of the above complaints s/p right TF L4-5 VEENA with Dr Joseph on 3/2/18.      Currently the patient reports incremental improvement of his back and leg symptoms.  States after the injection he noted his pain a 6/10 which now has continued to improve to about 4-5/10.  Still continues to have some difficulties with right low back and right hip and anterior thigh.  Denies numbness/tingling or weakness.      Denies issues with bowel or bladder.    Pertinent ROS:  CONSTITUTIONAL: Denies unintentional weight change,fatigue,fever,problematic headaches. Reports: none.  MSK: Denies as above. Reports: as above.  NEURO:  Denies as above. Reports: as above.     First visit: 10/12/17     Mikhail Rosas is a 33 year old year-old male who presented to our clinic with complaints of constant ACUTE low back pain with right hip/thigh pain.  Complaints have been present since 2 months ago, came on suddenly with no known trauma, injury or triggering event.       Currently he feels his constant ACUTE low back pain is more problemsome than his right hip pain.  his LBP is right-sided into the paraspinal musculature.  He has difficulty describing the pain, but voices a constant pressure in the right low back.  He notes worsening symptoms with twisting, lifting, and lateral bending.  Patient also endorses to pain into the right lateral hip and occasionally into the lateral thigh  particularly with movement of the right lower extremity.  Patient denies numbness/tingling or weakness.  He rates as a 5/10 but varies with intensity, ranging from 4-6/10.  Patient has not found relief as of yet.  he denies a significant recent change with bladder and/or bowel control.     · Time of day when pain is usually worse - night   · Sitting tolerance - 10-30 minutes  · Standing tolerance - 30-60 minutes  · Walking tolerance - several hours  · History of similar symptoms in the past - No     Prior treatments/medications tried/response:     Physical therapy: patient has attempted 5 sessions of PT without improvement of his symptoms - reports worsening symptoms after the therapy  TENs unit: no  Chiropractor: no   Acupuncture: no  Massage: no  Injections: no  Medications: naproxen (-), meloxicam (-)     SOCIAL HISTORY  Alcohol / Drug Hx: no  Smoking Hx:  Reviewed as per Epic  Psychological Hx: No    PAST MEDICAL HISTORY  Past Medical History:   Diagnosis Date   • GERD (gastroesophageal reflux disease) 9/26/2012    resolved 2016   • 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    Medications Prescribed in our clinic:     none    LABORATORY RESULTS:   No new results reviewed at today's visit.    IMAGING/DIAGNOSTIC STUDIES:   No new studies reviewed at today's visit.    PHYSICAL EXAM:    VITALS:   Visit Vitals  Wt 76.1 kg   BMI 25.50 kg/m²     GENERAL:   Well-groomed, well-nourished male in no distress.  Pleasant.   Does not appear to be toxic from medications or otherwise. Does not exhibit any pain behaviors.     NEUROLOGIC: A&Ox3. Coordination intact. Speech Fluent. Normal affect.  Memory recall intact.  Appropriate responses.  STABILITY:  Able to rise from a seated to standing position independently.  No LOB.  GAIT: normal casual non-antalgic.    RESPIRATORY:  Normal respirations without labored breathing.     IMPRESSION:      1. Right hip pain    2. Chronic low back pain, unspecified back pain laterality, with sciatica presence unspecified      PLAN:  I discussed with the patient the success that he continues to get with the injection is good.  We will continue to monitor as long as it is tolerable.  Patient advised we can repeat the injection if symptoms recur to where they are 8-10/10.  However, I still question if there is a component of hip pathology contributing to his symptoms.  Referral to orthopedics for consultation.  Otherwise, patient will follow up as needed basis.  Patient understands and agrees with the plan.  All questions answered.    He should call us in the meantime with any questions or concerns.    Whitney Doe PA-C/ Supervising Physician: Florecita Junior MD, Medical [facility]    Program status: Patient discharged         ready to quit

## 2019-06-28 NOTE — CONSULT NOTE ADULT - SUBJECTIVE AND OBJECTIVE BOX
MAURICE BEHAN  [id number]    HISTORY OF PRESENT ILLNESS:  73 year old M PMH Polymyalgia rheumatica (inactive for several years), Prostate Ca s/p prostatectomy, HLD presents with progressive RLE swelling and pain. Patient reports onset of right ankle swelling and pain 6/17 followed by pain in the right calf and right knee. He was treated with a medrol dose pack without relief. He returned this week Monday for a cortisone injection in the ankle by podiatry. Since then states pain has intensified and he is unable to walk without assistance of a walker. Denies fevers, chills however wife notes new night sweats during which patient will wake up drenched in sweat. Denies weight loss, no rashes, abdominal pain, N/V, diarrhea or myalgias. Notes mild chest pain only when taking a deep breath which he reports started after likely straining a muscle while trying to ambulate, denies shortness of breath, cough or exertional chest pain.  In the ED, Tmax: 98.2  HR 82 - 93 BP: 105/55 - 109/54 RR: 17 - 18 SpO2: 99% - 100%. Pt had an US of the RLE which demonstrated a 4 cm hypoechoic lesion in the right calf near the right posterior tibial vein. Xray of the right leg significant for no acute fracture or dislocation, preserved Joint spaces, Small knee joint effusion. Small plantar calcaneal enthesophyte with mild soft tissue swelling noted around the ankle.         PAST MEDICAL & SURGICAL HISTORY:  Polymyalgia rheumatica  Prostate cancer  H/O prostatectomy      Review of Systems:  Gen:  No fevers/chills, weight loss  HEENT: No blurry vision, no difficulty swallowing  CVS: No chest pain/palpitations  Resp: No SOB/wheezing  GI: No N/V/C/D/abdominal pain  MSK:  Skin: No new rashes  Neuro: No headaches    MEDICATIONS  (STANDING):  atorvastatin 40 milliGRAM(s) Oral at bedtime  docusate sodium 100 milliGRAM(s) Oral three times a day  enoxaparin Injectable 40 milliGRAM(s) SubCutaneous daily  pantoprazole    Tablet 40 milliGRAM(s) Oral two times a day  senna 2 Tablet(s) Oral at bedtime  sucralfate 1 Gram(s) Oral two times a day    MEDICATIONS  (PRN):  acetaminophen   Tablet .. 650 milliGRAM(s) Oral every 6 hours PRN Mild Pain (1 - 3)  polyethylene glycol 3350 17 Gram(s) Oral daily PRN Constipation  traMADol 100 milliGRAM(s) Oral every 6 hours PRN moderate to severe pain      Allergies    No Known Allergies    Intolerances        PERTINENT MEDICATION HISTORY:    SOCIAL HISTORY:  Smoking 8-10 cigarettes daily for past ~40 years. Social drinker, has 1 beer nightly   Lives with wife    FAMILY HISTORY:  FH: myocardial infarction: Dad age 40      Vital Signs Last 24 Hrs  T(C): 36.7 (28 Jun 2019 12:09), Max: 36.8 (28 Jun 2019 00:47)  T(F): 98 (28 Jun 2019 12:09), Max: 98.2 (28 Jun 2019 00:47)  HR: 80 (28 Jun 2019 12:09) (80 - 93)  BP: 90/52 (28 Jun 2019 12:09) (90/52 - 109/54)  BP(mean): --  RR: 17 (28 Jun 2019 12:09) (17 - 18)  SpO2: 100% (28 Jun 2019 12:09) (98% - 100%)    Daily Height in cm: 175.26 (28 Jun 2019 00:47)    Daily     Physical Exam:  General: No apparent distress  HEENT: EOMI, MMM  CVS: +S1/S2, RRR  Resp: CTA b/l  GI: Soft, NT/ND  MSK:    Neuro: AAOx3  muscle strength RUE LUE ; RLE LLE   Skin: no  rashes    LABS:                        10.4   5.56  )-----------( 219      ( 28 Jun 2019 11:15 )             31.0     06-28    135  |  97<L>  |  25<H>  ----------------------------<  112<H>  4.4   |  24  |  1.10    Ca    9.0      28 Jun 2019 05:22  Phos  3.1     06-28  Mg     2.2     06-28    TPro  7.3  /  Alb  3.2<L>  /  TBili  0.5  /  DBili  x   /  AST  29  /  ALT  35  /  AlkPhos  109  06-27    PT/INR - ( 27 Jun 2019 19:56 )   PT: 14.9 SEC;   INR: 1.33          PTT - ( 27 Jun 2019 19:56 )  PTT:29.4 SEC      Complete Blood Count + Automated Diff (06.27.19 @ 19:56)    Band Neutrophils %: 7.0 %    Sedimentation Rate, Erythrocyte (06.27.19 @ 19:56)    Sedimentation Rate, Erythrocyte: 44 mm/hr    C-Reactive Protein, Serum (06.27.19 @ 19:56)    C-Reactive Protein, Serum: 288.0 mg/L    Rheumatoid Factor Quant, Serum or Plasma in AM (06.28.19 @ 05:22)    Rheumatoid Factor Quant, Serum or Plasma: 23.0: RESULTS <14.0 IU/mL ARE NEGATIVE FOR RHEUMATOID FACTOR. IU/mL        RADIOLOGY & ADDITIONAL STUDIES:    < from: Xray Knee 3 Views, Right (06.27.19 @ 20:37) >  XAM:  RAD ANKLE MIN 3 VIEWS RIGHT      EXAM:  RAD TIB-FIB RT      EXAM:  RAD KNEE 3 VIEWS RIGHT        PROCEDURE DATE:  Jun 27 2019         INTERPRETATION:  CLINICAL INFORMATION: Progressive swelling of the right   lower extremity and knee/ankle pain.    TECHNIQUE: 3 views of the right knee, 2 views of the right tibia and   fibula, 3 views of the right ankle.    COMPARISON: None available.    IMPRESSION:     No acute fracture or dislocation. Joint spaces are preserved. Small knee   joint effusion. Small plantar calcaneal enthesophyte. Mild soft tissue   swelling is noted around the ankle.                KATY LANCASTER M.D., RADIOLOGY RESIDENT  This document has been electronically signed.  YURI CHARLES M.D., ATTENDING RADIOLOGIST  This document has been electronically signed. Jun 28 2019  7:40AM                  < end of copied text >      < from: US Duplex Venous Lower Ext Ltd, Right (06.27.19 @ 21:36) >  XAM:  US DPLX LWR EXT VEINS LTD RT        PROCEDURE DATE:  Jun 27 2019         INTERPRETATION:  CLINICAL INFORMATION: Right lower extremity pain    COMPARISON: None available.    TECHNIQUE: Duplex sonography of the RIGHT LOWER extremity veins with  color and spectral Doppler, with and without compression.      FINDINGS:  There is normal compressibility of the right common femoral, femoral and   popliteal veins.   The contralateral common femoral vein is patent.  Doppler examination shows normal spontaneous and phasic flow.  No calf vein thrombosis is detected.  There is a well-circumscribed predominantly hypoechoic, ovoid, avascular   lesion in the right calf near the right posterior tibial vein, measuring   4.0 x 1.0 x 1.9 cm. No surrounding hyperemia.    IMPRESSION:   No evidence of right lower extremity deep venous thrombosis.    A 4 cm hypoechoic lesion in the right calf near the right posterior   tibial vein. Differential includes hematoma versus cyst. Clinically   correlate.                        COSTA DENT M.D., RADIOLOGY RESIDENT  This document has been electronically signed.  NANCY CALHOUN M.D, ATTENDING RADIOLOGIST  This document has been electronically signed. Jun 27 2019 10:21PM                  < end of copied text > MAURICE BEHAN  [id number]    HISTORY OF PRESENT ILLNESS:  72 y/o M hx of remote PMR presented w L ankle, b/l knee swelling pain. Dx w PMR 6 years ago, tx w steroids fro 2 years. Pt was tx by Dr. Duke. Pt has been in remission since. Pt developed 5 weeks of R ankle pain. Saw podiatry, which diagnosed him w plantar fascitis, however, had R ankle steroid injection? Pt says he worsened after injection. Then pt developed R knee pain/swelling after several days. Pt now developing L knee swelling, pain. Has never had this before. Denies any alcohol, red meat, shrimp. No hx of gout. Pt denies any preceding URI/GI illness, no hx of GC chlamydia. No n/v/d/blood in stool. No rashes. No back pain, stiffness. No camping thick bites. No recent travel, sick contacts. Denies any rashes, fevers, chills, oral ulcers, SOB, cough, hemoptysis, epixtasis, abdominal pain, focal weakness, sensory loss.         PAST MEDICAL & SURGICAL HISTORY:  Polymyalgia rheumatica  Prostate cancer  H/O prostatectomy      Review of Systems:  per HPI otherwise negative    MEDICATIONS  (STANDING):  atorvastatin 40 milliGRAM(s) Oral at bedtime  docusate sodium 100 milliGRAM(s) Oral three times a day  enoxaparin Injectable 40 milliGRAM(s) SubCutaneous daily  pantoprazole    Tablet 40 milliGRAM(s) Oral two times a day  senna 2 Tablet(s) Oral at bedtime  sucralfate 1 Gram(s) Oral two times a day    MEDICATIONS  (PRN):  acetaminophen   Tablet .. 650 milliGRAM(s) Oral every 6 hours PRN Mild Pain (1 - 3)  polyethylene glycol 3350 17 Gram(s) Oral daily PRN Constipation  traMADol 100 milliGRAM(s) Oral every 6 hours PRN moderate to severe pain      Allergies    No Known Allergies    Intolerances        PERTINENT MEDICATION HISTORY:    SOCIAL HISTORY:  Smoking 8-10 cigarettes daily for past ~40 years. Social drinker, has 1 beer nightly   Lives with wife    FAMILY HISTORY:  FH: myocardial infarction: Dad age 40      Vital Signs Last 24 Hrs  T(C): 36.7 (28 Jun 2019 12:09), Max: 36.8 (28 Jun 2019 00:47)  T(F): 98 (28 Jun 2019 12:09), Max: 98.2 (28 Jun 2019 00:47)  HR: 80 (28 Jun 2019 12:09) (80 - 93)  BP: 90/52 (28 Jun 2019 12:09) (90/52 - 109/54)  BP(mean): --  RR: 17 (28 Jun 2019 12:09) (17 - 18)  SpO2: 100% (28 Jun 2019 12:09) (98% - 100%)    Daily Height in cm: 175.26 (28 Jun 2019 00:47)    Daily     Physical Exam:  General: No apparent distress  HEENT: EOMI, MMM  CVS: +S1/S2, RRR  Resp: CTA b/l, no w,c   GI: Soft, NT/ND  MSK: tenosynovitis of R ankle. R knee synovitis. L knee synovitis. Rest of joints w no synovitis. Affected joints w restricted ROM.   Neuro: AAOx3  Skin: no  rashes    LABS:                        10.4   5.56  )-----------( 219      ( 28 Jun 2019 11:15 )             31.0     06-28    135  |  97<L>  |  25<H>  ----------------------------<  112<H>  4.4   |  24  |  1.10    Ca    9.0      28 Jun 2019 05:22  Phos  3.1     06-28  Mg     2.2     06-28    TPro  7.3  /  Alb  3.2<L>  /  TBili  0.5  /  DBili  x   /  AST  29  /  ALT  35  /  AlkPhos  109  06-27    PT/INR - ( 27 Jun 2019 19:56 )   PT: 14.9 SEC;   INR: 1.33          PTT - ( 27 Jun 2019 19:56 )  PTT:29.4 SEC      Complete Blood Count + Automated Diff (06.27.19 @ 19:56)    Band Neutrophils %: 7.0 %    Sedimentation Rate, Erythrocyte (06.27.19 @ 19:56)    Sedimentation Rate, Erythrocyte: 44 mm/hr    C-Reactive Protein, Serum (06.27.19 @ 19:56)    C-Reactive Protein, Serum: 288.0 mg/L    Rheumatoid Factor Quant, Serum or Plasma in AM (06.28.19 @ 05:22)    Rheumatoid Factor Quant, Serum or Plasma: 23.0: RESULTS <14.0 IU/mL ARE NEGATIVE FOR RHEUMATOID FACTOR. IU/mL    Uric Acid, Serum (06.28.19 @ 05:22)    Uric Acid, Serum: 4.2 mg/dL          RADIOLOGY & ADDITIONAL STUDIES:    < from: Xray Knee 3 Views, Right (06.27.19 @ 20:37) >  XAM:  RAD ANKLE MIN 3 VIEWS RIGHT      EXAM:  RAD TIB-FIB RT      EXAM:  RAD KNEE 3 VIEWS RIGHT        PROCEDURE DATE:  Jun 27 2019         INTERPRETATION:  CLINICAL INFORMATION: Progressive swelling of the right   lower extremity and knee/ankle pain.    TECHNIQUE: 3 views of the right knee, 2 views of the right tibia and   fibula, 3 views of the right ankle.    COMPARISON: None available.    IMPRESSION:     No acute fracture or dislocation. Joint spaces are preserved. Small knee   joint effusion. Small plantar calcaneal enthesophyte. Mild soft tissue   swelling is noted around the ankle.                KATY LANCASTER M.D., RADIOLOGY RESIDENT  This document has been electronically signed.  YURI CHARLES M.D., ATTENDING RADIOLOGIST  This document has been electronically signed. Jun 28 2019  7:40AM                  < end of copied text >      < from: US Duplex Venous Lower Ext Ltd, Right (06.27.19 @ 21:36) >  XAM:  US DPLX LWR EXT VEINS LTD RT        PROCEDURE DATE:  Jun 27 2019         INTERPRETATION:  CLINICAL INFORMATION: Right lower extremity pain    COMPARISON: None available.    TECHNIQUE: Duplex sonography of the RIGHT LOWER extremity veins with  color and spectral Doppler, with and without compression.      FINDINGS:  There is normal compressibility of the right common femoral, femoral and   popliteal veins.   The contralateral common femoral vein is patent.  Doppler examination shows normal spontaneous and phasic flow.  No calf vein thrombosis is detected.  There is a well-circumscribed predominantly hypoechoic, ovoid, avascular   lesion in the right calf near the right posterior tibial vein, measuring   4.0 x 1.0 x 1.9 cm. No surrounding hyperemia.    IMPRESSION:   No evidence of right lower extremity deep venous thrombosis.    A 4 cm hypoechoic lesion in the right calf near the right posterior   tibial vein. Differential includes hematoma versus cyst. Clinically   correlate.                        COSTA DENT M.D., RADIOLOGY RESIDENT  This document has been electronically signed.  NANCY CALHOUN M.D, ATTENDING RADIOLOGIST  This document has been electronically signed. Jun 27 2019 10:21PM                  < end of copied text >

## 2019-06-28 NOTE — CONSULT NOTE ADULT - ASSESSMENT
73 year old M PMH Polymyalgia rheumatica (inactive for several years), Prostate Ca s/p prostatectomy, HLD presents with progressive RLE swelling and pain. GI consulted for anemia

## 2019-06-29 DIAGNOSIS — R50.9 FEVER, UNSPECIFIED: ICD-10-CM

## 2019-06-29 DIAGNOSIS — I48.91 UNSPECIFIED ATRIAL FIBRILLATION: ICD-10-CM

## 2019-06-29 PROBLEM — C61 MALIGNANT NEOPLASM OF PROSTATE: Chronic | Status: ACTIVE | Noted: 2018-09-13

## 2019-06-29 PROBLEM — C44.90 UNSPECIFIED MALIGNANT NEOPLASM OF SKIN, UNSPECIFIED: Chronic | Status: ACTIVE | Noted: 2018-09-13

## 2019-06-29 LAB
ANION GAP SERPL CALC-SCNC: 17 MMO/L — HIGH (ref 7–14)
APPEARANCE UR: CLEAR — SIGNIFICANT CHANGE UP
B PERT DNA SPEC QL NAA+PROBE: NOT DETECTED — SIGNIFICANT CHANGE UP
BACTERIA # UR AUTO: NEGATIVE — SIGNIFICANT CHANGE UP
BASOPHILS # BLD AUTO: 0.02 K/UL — SIGNIFICANT CHANGE UP (ref 0–0.2)
BASOPHILS NFR BLD AUTO: 0.4 % — SIGNIFICANT CHANGE UP (ref 0–2)
BILIRUB UR-MCNC: NEGATIVE — SIGNIFICANT CHANGE UP
BLOOD UR QL VISUAL: NEGATIVE — SIGNIFICANT CHANGE UP
BUN SERPL-MCNC: 26 MG/DL — HIGH (ref 7–23)
C PNEUM DNA SPEC QL NAA+PROBE: NOT DETECTED — SIGNIFICANT CHANGE UP
CALCIUM SERPL-MCNC: 9.4 MG/DL — SIGNIFICANT CHANGE UP (ref 8.4–10.5)
CHLORIDE SERPL-SCNC: 96 MMOL/L — LOW (ref 98–107)
CK SERPL-CCNC: 120 U/L — SIGNIFICANT CHANGE UP (ref 30–200)
CK SERPL-CCNC: 167 U/L — SIGNIFICANT CHANGE UP (ref 30–200)
CO2 SERPL-SCNC: 24 MMOL/L — SIGNIFICANT CHANGE UP (ref 22–31)
COLOR SPEC: YELLOW — SIGNIFICANT CHANGE UP
CREAT SERPL-MCNC: 1.06 MG/DL — SIGNIFICANT CHANGE UP (ref 0.5–1.3)
EOSINOPHIL # BLD AUTO: 0.04 K/UL — SIGNIFICANT CHANGE UP (ref 0–0.5)
EOSINOPHIL NFR BLD AUTO: 0.7 % — SIGNIFICANT CHANGE UP (ref 0–6)
FERRITIN SERPL-MCNC: 1240 NG/ML — HIGH (ref 30–400)
FLUAV H1 2009 PAND RNA SPEC QL NAA+PROBE: NOT DETECTED — SIGNIFICANT CHANGE UP
FLUAV H1 RNA SPEC QL NAA+PROBE: NOT DETECTED — SIGNIFICANT CHANGE UP
FLUAV H3 RNA SPEC QL NAA+PROBE: NOT DETECTED — SIGNIFICANT CHANGE UP
FLUAV SUBTYP SPEC NAA+PROBE: NOT DETECTED — SIGNIFICANT CHANGE UP
FLUBV RNA SPEC QL NAA+PROBE: NOT DETECTED — SIGNIFICANT CHANGE UP
FOLATE SERPL-MCNC: 10.8 NG/ML — SIGNIFICANT CHANGE UP (ref 4.7–20)
GLUCOSE SERPL-MCNC: 100 MG/DL — HIGH (ref 70–99)
GLUCOSE UR-MCNC: NEGATIVE — SIGNIFICANT CHANGE UP
HADV DNA SPEC QL NAA+PROBE: NOT DETECTED — SIGNIFICANT CHANGE UP
HAPTOGLOB SERPL-MCNC: 490 MG/DL — HIGH (ref 34–200)
HCOV PNL SPEC NAA+PROBE: SIGNIFICANT CHANGE UP
HCT VFR BLD CALC: 33.7 % — LOW (ref 39–50)
HGB BLD-MCNC: 11 G/DL — LOW (ref 13–17)
HMPV RNA SPEC QL NAA+PROBE: NOT DETECTED — SIGNIFICANT CHANGE UP
HPIV1 RNA SPEC QL NAA+PROBE: NOT DETECTED — SIGNIFICANT CHANGE UP
HPIV2 RNA SPEC QL NAA+PROBE: NOT DETECTED — SIGNIFICANT CHANGE UP
HPIV3 RNA SPEC QL NAA+PROBE: NOT DETECTED — SIGNIFICANT CHANGE UP
HPIV4 RNA SPEC QL NAA+PROBE: NOT DETECTED — SIGNIFICANT CHANGE UP
HYALINE CASTS # UR AUTO: SIGNIFICANT CHANGE UP
IMM GRANULOCYTES NFR BLD AUTO: 5.5 % — HIGH (ref 0–1.5)
IRON SATN MFR SERPL: 157 UG/DL — SIGNIFICANT CHANGE UP (ref 155–535)
IRON SATN MFR SERPL: 17 UG/DL — LOW (ref 45–165)
KETONES UR-MCNC: SIGNIFICANT CHANGE UP
LDH SERPL L TO P-CCNC: 293 U/L — HIGH (ref 135–225)
LEUKOCYTE ESTERASE UR-ACNC: NEGATIVE — SIGNIFICANT CHANGE UP
LYMPHOCYTES # BLD AUTO: 1.03 K/UL — SIGNIFICANT CHANGE UP (ref 1–3.3)
LYMPHOCYTES # BLD AUTO: 18.2 % — SIGNIFICANT CHANGE UP (ref 13–44)
MAGNESIUM SERPL-MCNC: 2.2 MG/DL — SIGNIFICANT CHANGE UP (ref 1.6–2.6)
MCHC RBC-ENTMCNC: 31.3 PG — SIGNIFICANT CHANGE UP (ref 27–34)
MCHC RBC-ENTMCNC: 32.6 % — SIGNIFICANT CHANGE UP (ref 32–36)
MCV RBC AUTO: 96 FL — SIGNIFICANT CHANGE UP (ref 80–100)
MONOCYTES # BLD AUTO: 0.67 K/UL — SIGNIFICANT CHANGE UP (ref 0–0.9)
MONOCYTES NFR BLD AUTO: 11.8 % — SIGNIFICANT CHANGE UP (ref 2–14)
NEUTROPHILS # BLD AUTO: 3.59 K/UL — SIGNIFICANT CHANGE UP (ref 1.8–7.4)
NEUTROPHILS NFR BLD AUTO: 63.4 % — SIGNIFICANT CHANGE UP (ref 43–77)
NITRITE UR-MCNC: NEGATIVE — SIGNIFICANT CHANGE UP
NRBC # FLD: 0 K/UL — SIGNIFICANT CHANGE UP (ref 0–0)
NT-PROBNP SERPL-SCNC: 3534 PG/ML — SIGNIFICANT CHANGE UP
PH UR: 6 — SIGNIFICANT CHANGE UP (ref 5–8)
PLATELET # BLD AUTO: 243 K/UL — SIGNIFICANT CHANGE UP (ref 150–400)
PMV BLD: 9.1 FL — SIGNIFICANT CHANGE UP (ref 7–13)
POTASSIUM SERPL-MCNC: 4.6 MMOL/L — SIGNIFICANT CHANGE UP (ref 3.5–5.3)
POTASSIUM SERPL-SCNC: 4.6 MMOL/L — SIGNIFICANT CHANGE UP (ref 3.5–5.3)
PROCALCITONIN SERPL-MCNC: 1.73 NG/ML — HIGH (ref 0.02–0.1)
PROT UR-MCNC: 200 — HIGH
RBC # BLD: 3.51 M/UL — LOW (ref 4.2–5.8)
RBC # FLD: 15 % — HIGH (ref 10.3–14.5)
RBC CASTS # UR COMP ASSIST: SIGNIFICANT CHANGE UP (ref 0–?)
RETICS #: 41 K/UL — SIGNIFICANT CHANGE UP (ref 25–125)
RETICS/RBC NFR: 1.2 % — SIGNIFICANT CHANGE UP (ref 0.5–2.5)
RSV RNA SPEC QL NAA+PROBE: NOT DETECTED — SIGNIFICANT CHANGE UP
RV+EV RNA SPEC QL NAA+PROBE: NOT DETECTED — SIGNIFICANT CHANGE UP
SODIUM SERPL-SCNC: 137 MMOL/L — SIGNIFICANT CHANGE UP (ref 135–145)
SP GR SPEC: 1.03 — SIGNIFICANT CHANGE UP (ref 1–1.04)
SQUAMOUS # UR AUTO: SIGNIFICANT CHANGE UP
TROPONIN T, HIGH SENSITIVITY: 23 NG/L — SIGNIFICANT CHANGE UP (ref ?–14)
TROPONIN T, HIGH SENSITIVITY: 24 NG/L — SIGNIFICANT CHANGE UP (ref ?–14)
TROPONIN T, HIGH SENSITIVITY: 24 NG/L — SIGNIFICANT CHANGE UP (ref ?–14)
TSH SERPL-MCNC: 1.84 UIU/ML — SIGNIFICANT CHANGE UP (ref 0.27–4.2)
UIBC SERPL-MCNC: 139.8 UG/DL — SIGNIFICANT CHANGE UP (ref 110–370)
UROBILINOGEN FLD QL: NORMAL — SIGNIFICANT CHANGE UP
WBC # BLD: 5.66 K/UL — SIGNIFICANT CHANGE UP (ref 3.8–10.5)
WBC # FLD AUTO: 5.66 K/UL — SIGNIFICANT CHANGE UP (ref 3.8–10.5)
WBC UR QL: SIGNIFICANT CHANGE UP (ref 0–?)

## 2019-06-29 PROCEDURE — 99232 SBSQ HOSP IP/OBS MODERATE 35: CPT

## 2019-06-29 PROCEDURE — 74177 CT ABD & PELVIS W/CONTRAST: CPT | Mod: 26

## 2019-06-29 PROCEDURE — 71260 CT THORAX DX C+: CPT | Mod: 26

## 2019-06-29 PROCEDURE — 93010 ELECTROCARDIOGRAM REPORT: CPT | Mod: 76

## 2019-06-29 PROCEDURE — 99233 SBSQ HOSP IP/OBS HIGH 50: CPT | Mod: GC

## 2019-06-29 PROCEDURE — 71045 X-RAY EXAM CHEST 1 VIEW: CPT | Mod: 26

## 2019-06-29 RX ORDER — GUAIFENESIN/DEXTROMETHORPHAN 600MG-30MG
10 TABLET, EXTENDED RELEASE 12 HR ORAL ONCE
Refills: 0 | Status: COMPLETED | OUTPATIENT
Start: 2019-06-29 | End: 2019-06-29

## 2019-06-29 RX ORDER — CEFTRIAXONE 500 MG/1
2000 INJECTION, POWDER, FOR SOLUTION INTRAMUSCULAR; INTRAVENOUS EVERY 24 HOURS
Refills: 0 | Status: DISCONTINUED | OUTPATIENT
Start: 2019-06-29 | End: 2019-07-01

## 2019-06-29 RX ORDER — AMIODARONE HYDROCHLORIDE 400 MG/1
1 TABLET ORAL
Qty: 450 | Refills: 0 | Status: DISCONTINUED | OUTPATIENT
Start: 2019-06-29 | End: 2019-07-03

## 2019-06-29 RX ORDER — AMIODARONE HYDROCHLORIDE 400 MG/1
0.5 TABLET ORAL
Qty: 450 | Refills: 0 | Status: DISCONTINUED | OUTPATIENT
Start: 2019-06-29 | End: 2019-07-03

## 2019-06-29 RX ORDER — METOPROLOL TARTRATE 50 MG
5 TABLET ORAL ONCE
Refills: 0 | Status: COMPLETED | OUTPATIENT
Start: 2019-06-29 | End: 2019-06-29

## 2019-06-29 RX ORDER — ACETAMINOPHEN 500 MG
650 TABLET ORAL EVERY 6 HOURS
Refills: 0 | Status: DISCONTINUED | OUTPATIENT
Start: 2019-06-29 | End: 2019-07-03

## 2019-06-29 RX ORDER — CALCIUM CARBONATE 500(1250)
2 TABLET ORAL ONCE
Refills: 0 | Status: COMPLETED | OUTPATIENT
Start: 2019-06-29 | End: 2019-06-29

## 2019-06-29 RX ORDER — AMIODARONE HYDROCHLORIDE 400 MG/1
150 TABLET ORAL ONCE
Refills: 0 | Status: COMPLETED | OUTPATIENT
Start: 2019-06-29 | End: 2019-06-29

## 2019-06-29 RX ORDER — PIPERACILLIN AND TAZOBACTAM 4; .5 G/20ML; G/20ML
3.38 INJECTION, POWDER, LYOPHILIZED, FOR SOLUTION INTRAVENOUS ONCE
Refills: 0 | Status: DISCONTINUED | OUTPATIENT
Start: 2019-06-29 | End: 2019-06-29

## 2019-06-29 RX ORDER — PIPERACILLIN AND TAZOBACTAM 4; .5 G/20ML; G/20ML
3.38 INJECTION, POWDER, LYOPHILIZED, FOR SOLUTION INTRAVENOUS EVERY 8 HOURS
Refills: 0 | Status: DISCONTINUED | OUTPATIENT
Start: 2019-06-29 | End: 2019-06-29

## 2019-06-29 RX ORDER — ASPIRIN/CALCIUM CARB/MAGNESIUM 324 MG
81 TABLET ORAL DAILY
Refills: 0 | Status: DISCONTINUED | OUTPATIENT
Start: 2019-06-29 | End: 2019-07-03

## 2019-06-29 RX ADMIN — Medication 1 GRAM(S): at 17:26

## 2019-06-29 RX ADMIN — TRAMADOL HYDROCHLORIDE 100 MILLIGRAM(S): 50 TABLET ORAL at 21:25

## 2019-06-29 RX ADMIN — CEFTRIAXONE 100 MILLIGRAM(S): 500 INJECTION, POWDER, FOR SOLUTION INTRAMUSCULAR; INTRAVENOUS at 18:24

## 2019-06-29 RX ADMIN — TRAMADOL HYDROCHLORIDE 100 MILLIGRAM(S): 50 TABLET ORAL at 05:31

## 2019-06-29 RX ADMIN — TRAMADOL HYDROCHLORIDE 100 MILLIGRAM(S): 50 TABLET ORAL at 06:31

## 2019-06-29 RX ADMIN — Medication 650 MILLIGRAM(S): at 12:45

## 2019-06-29 RX ADMIN — LIDOCAINE 2 PATCH: 4 CREAM TOPICAL at 07:00

## 2019-06-29 RX ADMIN — LIDOCAINE 2 PATCH: 4 CREAM TOPICAL at 20:38

## 2019-06-29 RX ADMIN — TRAMADOL HYDROCHLORIDE 100 MILLIGRAM(S): 50 TABLET ORAL at 11:54

## 2019-06-29 RX ADMIN — AMIODARONE HYDROCHLORIDE 16.67 MG/MIN: 400 TABLET ORAL at 19:17

## 2019-06-29 RX ADMIN — Medication 2 TABLET(S): at 06:57

## 2019-06-29 RX ADMIN — Medication 10 MILLILITER(S): at 06:57

## 2019-06-29 RX ADMIN — SODIUM CHLORIDE 75 MILLILITER(S): 9 INJECTION, SOLUTION INTRAVENOUS at 20:22

## 2019-06-29 RX ADMIN — SODIUM CHLORIDE 50 MILLILITER(S): 9 INJECTION, SOLUTION INTRAVENOUS at 05:30

## 2019-06-29 RX ADMIN — Medication 100 MILLIGRAM(S): at 16:00

## 2019-06-29 RX ADMIN — TRAMADOL HYDROCHLORIDE 100 MILLIGRAM(S): 50 TABLET ORAL at 11:24

## 2019-06-29 RX ADMIN — Medication 100 MILLIGRAM(S): at 05:31

## 2019-06-29 RX ADMIN — TRAMADOL HYDROCHLORIDE 100 MILLIGRAM(S): 50 TABLET ORAL at 20:25

## 2019-06-29 RX ADMIN — LIDOCAINE 2 PATCH: 4 CREAM TOPICAL at 10:00

## 2019-06-29 RX ADMIN — AMIODARONE HYDROCHLORIDE 33.33 MG/MIN: 400 TABLET ORAL at 13:01

## 2019-06-29 RX ADMIN — ENOXAPARIN SODIUM 40 MILLIGRAM(S): 100 INJECTION SUBCUTANEOUS at 16:00

## 2019-06-29 RX ADMIN — AMIODARONE HYDROCHLORIDE 618 MILLIGRAM(S): 400 TABLET ORAL at 12:45

## 2019-06-29 RX ADMIN — AMIODARONE HYDROCHLORIDE 16.67 MG/MIN: 400 TABLET ORAL at 20:24

## 2019-06-29 RX ADMIN — Medication 81 MILLIGRAM(S): at 11:24

## 2019-06-29 RX ADMIN — Medication 1 GRAM(S): at 05:31

## 2019-06-29 RX ADMIN — Medication 5 MILLIGRAM(S): at 11:24

## 2019-06-29 RX ADMIN — PANTOPRAZOLE SODIUM 40 MILLIGRAM(S): 20 TABLET, DELAYED RELEASE ORAL at 17:26

## 2019-06-29 RX ADMIN — Medication 650 MILLIGRAM(S): at 13:45

## 2019-06-29 RX ADMIN — LIDOCAINE 2 PATCH: 4 CREAM TOPICAL at 16:01

## 2019-06-29 RX ADMIN — PANTOPRAZOLE SODIUM 40 MILLIGRAM(S): 20 TABLET, DELAYED RELEASE ORAL at 05:31

## 2019-06-29 NOTE — PROGRESS NOTE ADULT - ASSESSMENT
72 y/o M hx of remote supposed PMR, presenting w acute oligoarthritis  s/p R ankle, R knee arthrocentesis w inflammatory fluid but no crystals  Currently febrile w RLL crackles likely PNA    ddx: septic joints (less likely) vs reactive arthritis vs viral hepatitis vs RA   Pt w R knee PMS of 93%, so cx should be followed for at least 24; however, less likely has pt no bacteremic, and pt has polyarthritis  Pt could have a reactive arthritis 2/2 to current likely PNA.   Pt could have RA, given b/l symmetric knee involvement, however time course rapid which unusual   Viral hepatitis consideration but unlikely, given no transaminitis   Possible collection shown on US of RLE; collection?     Recommend  Please obtain Hep B, C serologies  f/u CPP  Agree w RLE MRI to evaluate collection seen in US     Madan Thornton MD PGY4  [id number] 74 y/o M hx of remote supposed PMR, presenting w acute oligoarthritis  s/p R knee, right ankle arthrocentesis w inflammatory fluid Rt knee> right ankle but no crystals  Right knee collection - ? Baker's cyst  Currently febrile w RLL crackles likely PNA    ddx: septic joints (less likely)/ other infectious etiology vs reactive arthritis vs RA   Pt w R knee PMS of 93%, so cx should be followed for at least 24; however, less likely has pt no bacteremic, and pt has polyarthritis  Pt could have a reactive arthritis 2/2 to current likely PNA.   Pt could have RA, given b/l symmetric knee involvement, however time course rapid which unusual   Viral hepatitis consideration but unlikely, given no transaminitis   Possible collection shown on US of RLE; collection?     Recommend  Please obtain Hep B, C serologies  f/u CPP  Agree w RLE/ knee MRI to evaluate collection seen in US      Madan Thornton MD PGY4  [id number]

## 2019-06-29 NOTE — CONSULT NOTE ADULT - ASSESSMENT
New Onset afib with RVR since this am   BP on low side   will hold off to avn blockers and will try to convert to sinus with amio  The calculated [id number] score is 1 however given his sepsis, anemia , he is high risk of bleed , will hold off to a/c   obtain echo   obtain ct of chest   monitor LFT , TSH on amio     cough  abnormal lung exam  obtain CT of chest non contrast   consider pulm eval     anemia  plan for EGD  will hold off for now given new onset afib New Onset afib with RVR since this am   BP on low side   will hold off to avn blockers and will try to convert to sinus with amio  The calculated [id number] score is 1 however given his sepsis, anemia , he is high risk of bleed , will hold off to a/c   obtain echo   obtain ct of chest   monitor LFT , TSH on amio   trend CE  check proBNP    cough  abnormal lung exam  obtain CT of chest non contrast   consider pulm eval     anemia  plan for EGD  will hold off for now given new onset afib

## 2019-06-29 NOTE — PROGRESS NOTE ADULT - SUBJECTIVE AND OBJECTIVE BOX
MAURICE BEHAN  [id number]    INTERVAL HPI/OVERNIGHT EVENTS:  Pt still w persistent b/l knee pain, R ankle pain. Pt w low fever. Now w persistent cough.     MEDICATIONS  (STANDING):  amiodarone Infusion 1 mG/Min (33.333 mL/Hr) IV Continuous <Continuous>  amiodarone Infusion 0.5 mG/Min (16.667 mL/Hr) IV Continuous <Continuous>  aspirin enteric coated 81 milliGRAM(s) Oral daily  atorvastatin 40 milliGRAM(s) Oral at bedtime  cefTRIAXone   IVPB 2000 milliGRAM(s) IV Intermittent every 24 hours  docusate sodium 100 milliGRAM(s) Oral three times a day  enoxaparin Injectable 40 milliGRAM(s) SubCutaneous daily  ethyl chloride Spray 1 Application(s) Topical once  lactated ringers. 1000 milliLiter(s) (50 mL/Hr) IV Continuous <Continuous>  lidocaine   Patch 2 Patch Transdermal daily  lidocaine 1% Injectable 2 milliLiter(s) Local Injection once  lidocaine 2% Injectable 1 milliLiter(s) Local Injection once  pantoprazole    Tablet 40 milliGRAM(s) Oral two times a day  senna 2 Tablet(s) Oral at bedtime  sucralfate 1 Gram(s) Oral two times a day    MEDICATIONS  (PRN):  acetaminophen   Tablet .. 650 milliGRAM(s) Oral every 6 hours PRN Mild Pain (1 - 3)  acetaminophen   Tablet .. 650 milliGRAM(s) Oral every 6 hours PRN Temp greater or equal to 38C (100.4F)  polyethylene glycol 3350 17 Gram(s) Oral daily PRN Constipation  traMADol 100 milliGRAM(s) Oral every 6 hours PRN moderate to severe pain      Allergies    No Known Allergies    Intolerances        Review of Systems:   per HPI otherwise negative      Vital Signs Last 24 Hrs  T(C): 36.7 (2019 13:45), Max: 38.3 (2019 12:24)  T(F): 98 (2019 13:45), Max: 100.9 (2019 12:24)  HR: 82 (2019 15:51) (78 - 123)  BP: 102/60 (2019 15:51) (93/52 - 123/66)  BP(mean): --  RR: 18 (2019 15:51) (16 - 18)  SpO2: 97% (2019 15:51) (95% - 99%)    Physical Exam:  General: No apparent distress  HEENT: EOMI, MMM  CVS: +S1/S2, RRR  Resp: diffuse crackles in RLL  GI: Soft, NT/ND  MSK: tenosynovitis of R ankle. R knee synovitis. L knee synovitis. Rest of joints w no synovitis. Affected joints w restricted ROM.   Neuro: AAOx3  Skin: no  rashes    LABS:                        11.0   5.66  )-----------( 243      ( 2019 05:45 )             33.7         137  |  96<L>  |  26<H>  ----------------------------<  100<H>  4.6   |  24  |  1.06    Ca    9.4      2019 05:45  Phos  3.1       Mg     2.2         TPro  7.3  /  Alb  3.2<L>  /  TBili  0.5  /  DBili  x   /  AST  29  /  ALT  35  /  AlkPhos  109      PT/INR - ( 2019 19:56 )   PT: 14.9 SEC;   INR: 1.33          PTT - ( 2019 19:56 )  PTT:29.4 SEC  Urinalysis Basic - ( 2019 12:40 )    Color: YELLOW / Appearance: CLEAR / S.032 / pH: 6.0  Gluc: NEGATIVE / Ketone: TRACE  / Bili: NEGATIVE / Urobili: NORMAL   Blood: NEGATIVE / Protein: 200 / Nitrite: NEGATIVE   Leuk Esterase: NEGATIVE / RBC: 0-2 / WBC 0-2   Sq Epi: OCC / Non Sq Epi: x / Bacteria: NEGATIVE    Cell Count, Body Fluid (19 @ 20:29)    Body Fluid Type: ANKEL    Color - Body Fluid: RED    Clarity Body Fluid: BLOODY    Total Nucleated Cell Count, Body Fluid: 5888 cell/uL    Total RBC Count: 03770: Red Cell count correlates with the number and proportion of  cells on cytospin preparation. cell/uL      Body Fluid Differential (19 @ 20:29)    Total Cells Counted, Body Fluid: 100 cells    Seg Count, Body Fluid: 57 %    Lymphocyte Count, Body Fluid: 13 %    Monocytes - Body Fluid: 30 %    Crystals, Body Fluid: NONE SEEN    Cell Count, Body Fluid (19 @ 20:20)    Body Fluid Type: KNEE    Color - Body Fluid: PINK    Clarity Body Fluid: TURBID    Total Nucleated Cell Count, Body Fluid: 29563 cell/uL    Total RBC Count: 37816: Red Cell count correlates with the number and proportion of  cells on cytospin preparation. cell/uL      Body Fluid Differential (19 @ 20:20)    Total Cells Counted, Body Fluid: 100 cells    Seg Count, Body Fluid: 93 %    Lymphocyte Count, Body Fluid: 3 %    Monocytes - Body Fluid: 4 %    Crystals, Body Fluid: NONE SEEN    Culture - Surg Site Aerob/Anaer w/Gm St (19 @ 21:31)    Gram Stain Wound:   WBC^White Blood Cells  QNTY CELLS IN GRAM STAIN: FEW (2+)  NOS^No Organisms Seen    Culture - Surgical Site:   CULTURE IN PROGRESS, FURTHER REPORT TO FOLLOW.    Specimen Source: KNEE    Culture - Surg Site Aerob/Anaer w/Gm St (19 @ 21:29)    Gram Stain Wound:   WBC^White Blood Cells  QNTY CELLS IN GRAM STAIN: NO CELLS SEEN  NOS^No Organisms Seen    Culture - Surgical Site:   CULTURE IN PROGRESS, FURTHER REPORT TO FOLLOW.    Specimen Source: ANKLE            RADIOLOGY & ADDITIONAL TESTS: MAURICE BEHAN  [id number]    INTERVAL HPI/OVERNIGHT EVENTS:  Pt still w persistent b/l knee pain, R ankle pain. Pt w low fever. Now w persistent cough.     MEDICATIONS  (STANDING):  amiodarone Infusion 1 mG/Min (33.333 mL/Hr) IV Continuous <Continuous>  amiodarone Infusion 0.5 mG/Min (16.667 mL/Hr) IV Continuous <Continuous>  aspirin enteric coated 81 milliGRAM(s) Oral daily  atorvastatin 40 milliGRAM(s) Oral at bedtime  cefTRIAXone   IVPB 2000 milliGRAM(s) IV Intermittent every 24 hours  docusate sodium 100 milliGRAM(s) Oral three times a day  enoxaparin Injectable 40 milliGRAM(s) SubCutaneous daily  ethyl chloride Spray 1 Application(s) Topical once  lactated ringers. 1000 milliLiter(s) (50 mL/Hr) IV Continuous <Continuous>  lidocaine   Patch 2 Patch Transdermal daily  lidocaine 1% Injectable 2 milliLiter(s) Local Injection once  lidocaine 2% Injectable 1 milliLiter(s) Local Injection once  pantoprazole    Tablet 40 milliGRAM(s) Oral two times a day  senna 2 Tablet(s) Oral at bedtime  sucralfate 1 Gram(s) Oral two times a day    MEDICATIONS  (PRN):  acetaminophen   Tablet .. 650 milliGRAM(s) Oral every 6 hours PRN Mild Pain (1 - 3)  acetaminophen   Tablet .. 650 milliGRAM(s) Oral every 6 hours PRN Temp greater or equal to 38C (100.4F)  polyethylene glycol 3350 17 Gram(s) Oral daily PRN Constipation  traMADol 100 milliGRAM(s) Oral every 6 hours PRN moderate to severe pain      Allergies    No Known Allergies    Intolerances        Review of Systems:   per HPI otherwise negative      Vital Signs Last 24 Hrs  T(C): 36.7 (2019 13:45), Max: 38.3 (2019 12:24)  T(F): 98 (2019 13:45), Max: 100.9 (2019 12:24)  HR: 82 (2019 15:51) (78 - 123)  BP: 102/60 (2019 15:51) (93/52 - 123/66)  BP(mean): --  RR: 18 (2019 15:51) (16 - 18)  SpO2: 97% (2019 15:51) (95% - 99%)    Physical Exam:  General: No apparent distress  HEENT: EOMI, MMM  CVS: +S1/S2, RRR  Resp: diffuse crackles in RLL  GI: Soft, NT/ND  MSK: tenosynovitis of R ankle. R knee synovitis. good ROM, although mild decrease range of flexion. Right popliteal fossa fulness and right calf fullness and tenderness  Rest of joints w no synovitis. Left knee effusion, but slightly decreased range on flexion. Affected joints w restricted ROM.   Neuro: AAOx3  Skin: no  rashes    LABS:                        11.0   5.66  )-----------( 243      ( 2019 05:45 )             33.7         137  |  96<L>  |  26<H>  ----------------------------<  100<H>  4.6   |  24  |  1.06    Ca    9.4      2019 05:45  Phos  3.1       Mg     2.2         TPro  7.3  /  Alb  3.2<L>  /  TBili  0.5  /  DBili  x   /  AST  29  /  ALT  35  /  AlkPhos  109      PT/INR - ( 2019 19:56 )   PT: 14.9 SEC;   INR: 1.33          PTT - ( 2019 19:56 )  PTT:29.4 SEC  Urinalysis Basic - ( 2019 12:40 )    Color: YELLOW / Appearance: CLEAR / S.032 / pH: 6.0  Gluc: NEGATIVE / Ketone: TRACE  / Bili: NEGATIVE / Urobili: NORMAL   Blood: NEGATIVE / Protein: 200 / Nitrite: NEGATIVE   Leuk Esterase: NEGATIVE / RBC: 0-2 / WBC 0-2   Sq Epi: OCC / Non Sq Epi: x / Bacteria: NEGATIVE    Cell Count, Body Fluid (19 @ 20:29)    Body Fluid Type: ANKLE    Color - Body Fluid: RED    Clarity Body Fluid: BLOODY    Total Nucleated Cell Count, Body Fluid: 5888 cell/uL    Total RBC Count: 12201 cell/uL: Red Cell count correlates with the number and proportion of  cells on cytospin preparation.     Body Fluid Differential (19 @ 20:29)    Total Cells Counted, Body Fluid: 100 cells    Seg Count, Body Fluid: 57 %    Lymphocyte Count, Body Fluid: 13 %    Monocytes - Body Fluid: 30 %    Crystals, Body Fluid: NONE SEEN    Cell Count, Body Fluid (19 @ 20:20)    Body Fluid Type: KNEE    Color - Body Fluid: PINK    Clarity Body Fluid: TURBID    Total Nucleated Cell Count, Body Fluid: 78026 cell/uL    Total RBC Count: 51153 cell/uL : Red Cell count correlates with the number and proportion of  cells on cytospin preparation.    Body Fluid Differential (19 @ 20:20)    Total Cells Counted, Body Fluid: 100 cells    Seg Count, Body Fluid: 93 %    Lymphocyte Count, Body Fluid: 3 %    Monocytes - Body Fluid: 4 %    Crystals, Body Fluid: NONE SEEN    Culture - Surg Site Aerob/Anaer w/Gm St (19 @ 21:31)    Gram Stain Wound:   WBC^White Blood Cells  QNTY CELLS IN GRAM STAIN: FEW (2+)  NOS^No Organisms Seen    Culture - Surgical Site:   CULTURE IN PROGRESS, FURTHER REPORT TO FOLLOW.    Specimen Source: KNEE    Culture - Surg Site Aerob/Anaer w/Gm St (19 @ 21:29)    Gram Stain Wound:   WBC^White Blood Cells  QNTY CELLS IN GRAM STAIN: NO CELLS SEEN  NOS^No Organisms Seen    Culture - Surgical Site:   CULTURE IN PROGRESS, FURTHER REPORT TO FOLLOW.    Specimen Source: ANKLE        RADIOLOGY & ADDITIONAL TESTS:

## 2019-06-29 NOTE — PROGRESS NOTE ADULT - SUBJECTIVE AND OBJECTIVE BOX
Dr. Fitzpatrick pager [phone]    Patient is a 73y old  Male who presents with a chief complaint of RLE pain (2019 14:55)      SUBJECTIVE / OVERNIGHT EVENTS: pt seen and examined at 1030a- felt no better than yesterday  looked tired.  c/o CP sub sternal  on exam HR irreg- EKG confirmed rapid afib- placed on tele- BP 120s- given lopressor 5mg IVP and cards consulted.  later pt w/ fever rectally- cultures sent.  said left knee pain was "not too bad".  right knee was painful        MEDICATIONS  (STANDING):  amiodarone Infusion 1 mG/Min (33.333 mL/Hr) IV Continuous <Continuous>  amiodarone Infusion 0.5 mG/Min (16.667 mL/Hr) IV Continuous <Continuous>  aspirin enteric coated 81 milliGRAM(s) Oral daily  atorvastatin 40 milliGRAM(s) Oral at bedtime  cefTRIAXone   IVPB 2 milliGRAM(s) IV Intermittent every 24 hours  docusate sodium 100 milliGRAM(s) Oral three times a day  enoxaparin Injectable 40 milliGRAM(s) SubCutaneous daily  ethyl chloride Spray 1 Application(s) Topical once  lactated ringers. 1000 milliLiter(s) (50 mL/Hr) IV Continuous <Continuous>  lidocaine   Patch 2 Patch Transdermal daily  lidocaine 1% Injectable 2 milliLiter(s) Local Injection once  lidocaine 2% Injectable 1 milliLiter(s) Local Injection once  pantoprazole    Tablet 40 milliGRAM(s) Oral two times a day  senna 2 Tablet(s) Oral at bedtime  sucralfate 1 Gram(s) Oral two times a day    MEDICATIONS  (PRN):  acetaminophen   Tablet .. 650 milliGRAM(s) Oral every 6 hours PRN Mild Pain (1 - 3)  acetaminophen   Tablet .. 650 milliGRAM(s) Oral every 6 hours PRN Temp greater or equal to 38C (100.4F)  polyethylene glycol 3350 17 Gram(s) Oral daily PRN Constipation  traMADol 100 milliGRAM(s) Oral every 6 hours PRN moderate to severe pain      Meds ordered within last 24hours  lidocaine   Patch: [Ordered as LIDODERM]  2 Patch, Transdermal, daily  Special Instructions: to posterior calf and on top of knee (right)  Administration Instructions: Patch may remain in place for up to 12 hours in any 24-hour period.  * External Use Only *  Provider's Contact #: [phone] ( @ 18:25)  lactated ringers.: Solution, 1000 milliLiter(s) infuse at 50 mL/Hr  Provider's Contact #: [phone] ( @ 18:25)  ethyl chloride Spray:   1 Application(s), Topical, once, To affected area, Stop After 1 Doses  Administration Instructions: external use only  Return unused medication to Pharmacy.  Provider's Contact #: [phone] ( @ 19:57)  lidocaine 1% Injectable:   2 milliLiter(s), Local Injection, once, Stop After 1 Doses  Provider's Contact #: [phone] ( @ 19:57)  lidocaine 2% Injectable:   1 milliLiter(s), Local Injection, once, Stop After 1 Doses  Provider's Contact #: [phone] ( @ 19:57)  calcium carbonate    500 mG (Tums) Chewable: [Ordered as TUMS]  2 Tablet(s), Chew, once, Stop After 1 Doses  Administration Instructions: Calcium Carbonate 500 mG = elemental calcium 200 mG  Provider's Contact #: [phone] ( @ 05:24)  guaiFENesin/dextromethorphan  Syrup: [Known as ROBITUSSIN-DM Syrup]  10 milliLiter(s), Oral, once, Stop After 1 Doses  Administration Instructions: guaiFENesin 100 mG/dextromethorphan 10 mG per 5 mL  Dispose unused medication in BLACK bin.  This is a Look-alike/Sound-alike Medication ( @ 05:24)  metoprolol tartrate Injectable: [Ordered as LOPRESSOR Injectable]  5 milliGRAM(s), IV Push, once, Stop After 1 Doses ( @ 11:16)  aspirin enteric coated: [Known as Ecotrin]  81 milliGRAM(s), Oral, daily  Administration Instructions: Swallow whole * don't crush/chew ( @ 11:16)  amiodarone IVPB:   150 milliGRAM(s) in Excel bag dextrose 5% 100 milliLiter(s), IV Intermittent, once, infuse over 10 Minute(s), Stop After 1 Doses  Administration Instructions: Use an in-line filter. ( @ 12:08)  amiodarone Infusion:   450 milliGRAM(s) in Excel bag dextrose 5% 250 milliLiter(s), infuse at 33.333 mL/Hr  Dose Rate: 1 mG/Min; Stop After 6 Hours  Administration Instructions: Use an in-line filter. ( @ 12:08)  amiodarone Infusion:   450 milliGRAM(s) in Excel bag dextrose 5% 250 milliLiter(s), infuse at 16.667 mL/Hr  Dose Rate: 0.5 mG/Min; Stop After 18 Hours  Administration Instructions: Use an in-line filter.  Special Instructions: to be given after the 1mg dose ( @ 12:08)  acetaminophen   Tablet ..: [Ordered as TYLENOL..]  650 milliGRAM(s), Oral, every 6 hours, PRN for Temp greater or equal to 38C (100.4F)  Administration Instructions: MAX DAILY DOSE:  ADULT = 4,000 mG/Day ( @ 12:28)  piperacillin/tazobactam IVPB.: [Known as ZOSYN IVPB - Initial Dose]  3.375 Gram(s) in dextrose 5% 100 milliLiter(s), IV Intermittent, once, infuse over 30 Minute(s), Stop After 1 Doses  Indication: Empiric dosing  Special Instructions: INITIAL Dose     (Adult Calc Info: Calculation : (3.375 Gram(s) Flat Dose)  Calculated Dose : 3.375 Gram(s)) ( @ 12:45)  piperacillin/tazobactam IVPB..: [Known as ZOSYN IVPB.]  3.375 Gram(s) in dextrose 5% 100 milliLiter(s), IV Intermittent, every 8 hours, infuse over 4 Hour(s), Stop After 7 Days  Indication: Empiric dosing  Special Instructions: MAINTENANCE Dose     (Adult Calc Info: Calculation : (3.375 Gram(s) Flat Dose)  Calculated Dose : 3.375 Gram(s)) ( @ 12:45)  cefTRIAXone   IVPB: [Known as ROCEPHIN  IVPB]  2 milliGRAM(s) in dextrose 5% 50 milliLiter(s), IV Intermittent, every 24 hours, infuse over 30 Minute(s), Stop After 10 Days  Indication: septic joint  Administration Instructions: This is a Look-alike/Sound-alike Medication  Provider's Contact #: [phone] ( @ 14:27)      T(C): 36.7 (19 @ 13:45), Max: 38.3 (19 @ 12:24)  HR: 82 (19 @ 15:51) (78 - 123)  BP: 102/60 (19 @ 15:51) (93/52 - 123/66)  RR: 18 (19 @ 15:51) (16 - 18)  SpO2: 97% (19 @ 15:51) (95% - 99%)    CAPILLARY BLOOD GLUCOSE        I&O's Summary      PHYSICAL EXAM:  GENERAL: tired  CHEST/LUNG: coarse bs b/l  HEART: Regular rate and rhythm; No murmurs, rubs, or gallops  ABDOMEN: Soft, Nontender, Nondistended; Bowel sounds present  EXTREMITIES:  +1 RLE edema  NEURO: A&Ox3      LABS:                        11.0   5.66  )-----------( 243      ( 2019 05:45 )             33.7     -    137  |  96<L>  |  26<H>  ----------------------------<  100<H>  4.6   |  24  |  1.06    Ca    9.4      2019 05:45  Phos  3.1     -  Mg     2.2     -    TPro  7.3  /  Alb  3.2<L>  /  TBili  0.5  /  DBili  x   /  AST  29  /  ALT  35  /  AlkPhos  109  06-27    PT/INR - ( 2019 19:56 )   PT: 14.9 SEC;   INR: 1.33          PTT - ( 2019 19:56 )  PTT:29.4 SEC  CARDIAC MARKERS ( 2019 12:35 )  x     / x     / 120 u/L / x     / x      CARDIAC MARKERS ( 2019 05:45 )  x     / x     / 167 u/L / x     / x          Urinalysis Basic - ( 2019 12:40 )    Color: YELLOW / Appearance: CLEAR / S.032 / pH: 6.0  Gluc: NEGATIVE / Ketone: TRACE  / Bili: NEGATIVE / Urobili: NORMAL   Blood: NEGATIVE / Protein: 200 / Nitrite: NEGATIVE   Leuk Esterase: NEGATIVE / RBC: 0-2 / WBC 0-2   Sq Epi: OCC / Non Sq Epi: x / Bacteria: NEGATIVE        RADIOLOGY & ADDITIONAL TESTS:    Imaging Personally Reviewed:    Consultant(s) Notes Reviewed:      Care Discussed with Consultants/Other Providers:

## 2019-06-29 NOTE — PROGRESS NOTE ADULT - ASSESSMENT
ugib  melena    monitor cbc, transfuse prn,  carafate 1 gram bid  PPI qd  hold A/C and NSAIDs  EGD once optimized on monday

## 2019-06-29 NOTE — PROGRESS NOTE ADULT - PROBLEM SELECTOR PLAN 1
likely stress response to underlying inflammation vs. ?infection in body  seen by cards  amiodarone load initiated  tele for now  pro BNP elevated- pt w/ minimal po intake w/ soft bp's- will watch for now

## 2019-06-29 NOTE — PROGRESS NOTE ADULT - SUBJECTIVE AND OBJECTIVE BOX
Follow Up:      Inverval History/ROS:Patient is a 73y old  Male who presents with a chief complaint of RLE pain (2019 12:05)    S/p arthrocentesis of knee and ankle    + fever    Allergies    No Known Allergies    Intolerances        ANTIMICROBIALS:  piperacillin/tazobactam IVPB. 3.375 once  piperacillin/tazobactam IVPB.. 3.375 every 8 hours      OTHER MEDS:  acetaminophen   Tablet .. 650 milliGRAM(s) Oral every 6 hours PRN  acetaminophen   Tablet .. 650 milliGRAM(s) Oral every 6 hours PRN  amiodarone Infusion 1 mG/Min IV Continuous <Continuous>  amiodarone Infusion 0.5 mG/Min IV Continuous <Continuous>  aspirin enteric coated 81 milliGRAM(s) Oral daily  atorvastatin 40 milliGRAM(s) Oral at bedtime  docusate sodium 100 milliGRAM(s) Oral three times a day  enoxaparin Injectable 40 milliGRAM(s) SubCutaneous daily  ethyl chloride Spray 1 Application(s) Topical once  lactated ringers. 1000 milliLiter(s) IV Continuous <Continuous>  lidocaine   Patch 2 Patch Transdermal daily  lidocaine 1% Injectable 2 milliLiter(s) Local Injection once  lidocaine 2% Injectable 1 milliLiter(s) Local Injection once  pantoprazole    Tablet 40 milliGRAM(s) Oral two times a day  polyethylene glycol 3350 17 Gram(s) Oral daily PRN  senna 2 Tablet(s) Oral at bedtime  sucralfate 1 Gram(s) Oral two times a day  traMADol 100 milliGRAM(s) Oral every 6 hours PRN      Vital Signs Last 24 Hrs  T(C): 38.3 (2019 12:24), Max: 38.3 (2019 12:24)  T(F): 100.9 (2019 12:24), Max: 100.9 (2019 12:24)  HR: 83 (2019 12:50) (78 - 123)  BP: 105/62 (2019 12:50) (93/52 - 123/66)  BP(mean): --  RR: 18 (2019 12:40) (16 - 18)  SpO2: 99% (2019 12:40) (95% - 99%)    PHYSICAL EXAM:  General: [x ] non-toxic  HEAD/EYES: [ ] PERRL [ x] white sclera [ ] icterus  ENT:  [ ] normal [x ] supple [ ] thrush [ ] pharyngeal exudate  Cardiovascular:   [ ] murmur [ x] normal [ ] PPM/AICD  Respiratory:  [ x] clear to ausculation bilaterally  GI:  [x ] soft, non-tender, normal bowel sounds  :  [ ] hamlin [ ]x no CVA tenderness   Musculoskeletal:  [x ] + knee effusion  Neurologic:  [ ] non-focal exam   Skin:  [x ] no rash  Lymph: [ ] no lymphadenopathy  Psychiatric:  [ ] appropriate affect [ x] alert & oriented  Lines:  [ x] no phlebitis [ ] central line                                11.0   5.66  )-----------( 243      ( 2019 05:45 )             33.7           137  |  96<L>  |  26<H>  ----------------------------<  100<H>  4.6   |  24  |  1.06    Ca    9.4      2019 05:45  Phos  3.1       Mg     2.2         TPro  7.3  /  Alb  3.2<L>  /  TBili  0.5  /  DBili  x   /  AST  29  /  ALT  35  /  AlkPhos  109        Urinalysis Basic - ( 2019 12:40 )    Color: YELLOW / Appearance: CLEAR / S.032 / pH: 6.0  Gluc: NEGATIVE / Ketone: TRACE  / Bili: NEGATIVE / Urobili: NORMAL   Blood: NEGATIVE / Protein: 200 / Nitrite: NEGATIVE   Leuk Esterase: NEGATIVE / RBC: 0-2 / WBC 0-2   Sq Epi: OCC / Non Sq Epi: x / Bacteria: NEGATIVE        MICROBIOLOGY:Culture - Surgical Site:   CULTURE IN PROGRESS, FURTHER REPORT TO FOLLOW. (19 @ 21:31)  Culture - Surgical Site:   CULTURE IN PROGRESS, FURTHER REPORT TO FOLLOW. (19 @ 21:29)      RADIOLOGY:

## 2019-06-29 NOTE — PROGRESS NOTE ADULT - ASSESSMENT
&3 year old with history of PMR and prostate cancer presents with right lower extremity pain.  He has associated arthralgias.     He does not appear toxic.     My concern for infection is not high but elevated bands and procalcitonin are concerning    1) Abnormal imaging of the right leg    Consider MRI of the lag      2) Bandemia with elevated procalcitonin    Hard to interpret in absence of more focal exam and abscence of fever    Check blood culture  Repeat CBC with differential  Can check lyme serology    3) Joint pain  Polyarticular  Check lyme  Check parvovirus pcr    S/p arthrocentesis cell count from ankle and knee are both less that 25 K    I would expect him to be sicker with polyarticular septic arthritis.  I would also expect WBC cell count to be higher    In light of fever, can give ceftriaxone pending culture results. If cultures are negative, stop abx

## 2019-06-29 NOTE — PROGRESS NOTE ADULT - ASSESSMENT
73 year old M PMH Polymyalgia rheumatica (inactive for several years), Prostate Ca s/p prostatectomy sees doctors regularly , HLD presented with progressive RLE swelling and pain a/w inability to ambulate- sono of leg w/ 4cm cyst vs hematoma in calf- d/w radiology ?hematoma- MRI tib/fib ordered;  pt w/ night sweats, elev CRP and procalcitonin- ID called  given elev BUN, steroids and nsaids recently, with anemia- GI called  ortho consulted but will see pt depending on MRI results  Rheum consulted as well for h/o PMR  Suspect rle pain/hematoma could be due to calf muscle strain from calf stretching exercises.    6/29- new onset rapid afib- converted to sinus w amiodarone- neg trops; on tele now  +fever

## 2019-06-29 NOTE — PROGRESS NOTE ADULT - SUBJECTIVE AND OBJECTIVE BOX
INTERVAL HPI/OVERNIGHT EVENTS:  No new overnight event.  No N/V/D.  Tolerating diet.  no melena    Allergies    No Known Allergies    Intolerances  General:  No wt loss, fevers, chills, night sweats, fatigue,   Eyes:  Good vision, no reported pain  ENT:  No sore throat, pain, runny nose, dysphagia  CV:  No pain, palpitations, hypo/hypertension  Resp:  No dyspnea, cough, tachypnea, wheezing  GI:  No pain, No nausea, No vomiting, No diarrhea, No constipation, No weight loss, No fever, No pruritis, No rectal bleeding, No tarry stools, No dysphagia,  :  No pain, bleeding, incontinence, nocturia  Muscle:  No pain, weakness  Neuro:  No weakness, tingling, memory problems  Psych:  No fatigue, insomnia, mood problems, depression  Endocrine:  No polyuria, polydipsia, cold/heat intolerance  Heme:  No petechiae, ecchymosis, easy bruisability  Skin:  No rash, tattoos, scars, edema      PHYSICAL EXAM:   Vital Signs:  Vital Signs Last 24 Hrs  T(C): 38.3 (2019 12:24), Max: 38.3 (2019 12:24)  T(F): 100.9 (2019 12:24), Max: 100.9 (2019 12:24)  HR: 83 (2019 12:50) (78 - 123)  BP: 105/62 (2019 12:50) (93/52 - 123/66)  BP(mean): --  RR: 18 (2019 12:40) (16 - 18)  SpO2: 99% (2019 12:40) (95% - 99%)  Daily     Daily I&O's Summary      GENERAL:  Appears stated age, well-groomed, well-nourished, no distress  HEENT:  NC/AT,  conjunctivae clear and pink, no thyromegaly, nodules, adenopathy, no JVD, sclera -anicteric  CHEST:  Full & symmetric excursion, no increased effort, breath sounds clear  HEART:  Regular rhythm, S1, S2, no murmur/rub/S3/S4, no abdominal bruit, no edema  ABDOMEN:  Soft, non-tender, non-distended, normoactive bowel sounds,  no masses ,no hepato-splenomegaly, no signs of chronic liver disease  EXTEREMITIES:  no cyanosis,clubbing or edema  SKIN:  No rash/erythema/ecchymoses/petechiae/wounds/abscess/warm/dry  NEURO:  Alert, oriented, no asterixis, no tremor, no encephalopathy      LABS:                        11.0   5.66  )-----------( 243      ( 2019 05:45 )             33.7     06-    137  |  96<L>  |  26<H>  ----------------------------<  100<H>  4.6   |  24  |  1.06    Ca    9.4      2019 05:45  Phos  3.1     -  Mg     2.2         TPro  7.3  /  Alb  3.2<L>  /  TBili  0.5  /  DBili  x   /  AST  29  /  ALT  35  /  AlkPhos  109  06-    PT/INR - ( 2019 19:56 )   PT: 14.9 SEC;   INR: 1.33          PTT - ( 2019 19:56 )  PTT:29.4 SEC  Urinalysis Basic - ( 2019 12:40 )    Color: YELLOW / Appearance: CLEAR / S.032 / pH: 6.0  Gluc: NEGATIVE / Ketone: TRACE  / Bili: NEGATIVE / Urobili: NORMAL   Blood: NEGATIVE / Protein: 200 / Nitrite: NEGATIVE   Leuk Esterase: NEGATIVE / RBC: 0-2 / WBC 0-2   Sq Epi: OCC / Non Sq Epi: x / Bacteria: NEGATIVE      amylase   lipase  RADIOLOGY & ADDITIONAL TESTS:

## 2019-06-29 NOTE — CONSULT NOTE ADULT - SUBJECTIVE AND OBJECTIVE BOX
CHIEF COMPLAINT:Patient is a 73y old  Male who presents with a chief complaint of RLE pain (28 Jun 2019 17:34)      HISTORY OF PRESENT ILLNESS:    73 male with history as below admitted with right lower ext pain and edema , going to his knee rule out for DVT now with cough and noted to have afib with RVR  of note he is also anemia  he denies any dizziness, palpitation or syncope  has right sided chest pain with inspiration     PAST MEDICAL & SURGICAL HISTORY:  Polymyalgia rheumatica  Prostate cancer  Prostate cancer  Skin cancer  Hypercholesteremia  H/O prostatectomy  H/O inguinal hernia: left  H/O prostatectomy          MEDICATIONS:  aspirin enteric coated 81 milliGRAM(s) Oral daily  enoxaparin Injectable 40 milliGRAM(s) SubCutaneous daily        acetaminophen   Tablet .. 650 milliGRAM(s) Oral every 6 hours PRN  traMADol 100 milliGRAM(s) Oral every 6 hours PRN    docusate sodium 100 milliGRAM(s) Oral three times a day  pantoprazole    Tablet 40 milliGRAM(s) Oral two times a day  polyethylene glycol 3350 17 Gram(s) Oral daily PRN  senna 2 Tablet(s) Oral at bedtime  sucralfate 1 Gram(s) Oral two times a day    atorvastatin 40 milliGRAM(s) Oral at bedtime    ethyl chloride Spray 1 Application(s) Topical once  lactated ringers. 1000 milliLiter(s) IV Continuous <Continuous>  lidocaine   Patch 2 Patch Transdermal daily      FAMILY HISTORY:  FH: myocardial infarction: Dad age 40      Non-contributory    SOCIAL HISTORY:    No tobacco, drugs or etoh    Allergies    No Known Allergies    Intolerances    	    REVIEW OF SYSTEMS:  as above  The rest of the 14 points ROS reviewed and except above they are unremarkable.        PHYSICAL EXAM:  T(C): 36.7 (06-29-19 @ 05:19), Max: 37.1 (06-28-19 @ 20:09)  HR: 112 (06-29-19 @ 11:38) (78 - 123)  BP: 102/60 (06-29-19 @ 11:38) (90/52 - 123/66)  RR: 16 (06-29-19 @ 11:09) (16 - 17)  SpO2: 96% (06-29-19 @ 11:09) (95% - 100%)  Wt(kg): --  I&O's Summary      JVP: Normal  Neck: supple  Lung: right sided ins crackles and rhonchi   CV: S1 S2 , Murmur:  Abd: soft  Ext: No edema  neuro: Awake / alert  Psych: flat affect  Skin: normal      LABS/DATA:    TELEMETRY: 	  afib with RVR   ECG:  	   	  CARDIAC MARKERS:                                      11.0   5.66  )-----------( 243      ( 29 Jun 2019 05:45 )             33.7     06-29    137  |  96<L>  |  26<H>  ----------------------------<  100<H>  4.6   |  24  |  1.06    Ca    9.4      29 Jun 2019 05:45  Phos  3.1     06-28  Mg     2.2     06-29    TPro  7.3  /  Alb  3.2<L>  /  TBili  0.5  /  DBili  x   /  AST  29  /  ALT  35  /  AlkPhos  109  06-27    proBNP:   Lipid Profile:   HgA1c:   TSH: Thyroid Stimulating Hormone, Serum: 1.84 uIU/mL (06-29 @ 05:45)

## 2019-06-30 LAB
ANION GAP SERPL CALC-SCNC: 14 MMO/L — SIGNIFICANT CHANGE UP (ref 7–14)
B BURGDOR C6 AB SER-ACNC: NEGATIVE — SIGNIFICANT CHANGE UP
B BURGDOR IGG+IGM SER-ACNC: 0.03 INDEX — SIGNIFICANT CHANGE UP (ref 0.01–0.89)
BASOPHILS # BLD AUTO: 0.03 K/UL — SIGNIFICANT CHANGE UP (ref 0–0.2)
BASOPHILS NFR BLD AUTO: 0.6 % — SIGNIFICANT CHANGE UP (ref 0–2)
BUN SERPL-MCNC: 31 MG/DL — HIGH (ref 7–23)
CALCIUM SERPL-MCNC: 9.7 MG/DL — SIGNIFICANT CHANGE UP (ref 8.4–10.5)
CHLORIDE SERPL-SCNC: 97 MMOL/L — LOW (ref 98–107)
CO2 SERPL-SCNC: 22 MMOL/L — SIGNIFICANT CHANGE UP (ref 22–31)
CREAT SERPL-MCNC: 0.9 MG/DL — SIGNIFICANT CHANGE UP (ref 0.5–1.3)
D DIMER BLD IA.RAPID-MCNC: 1540 NG/ML — SIGNIFICANT CHANGE UP
EOSINOPHIL # BLD AUTO: 0.05 K/UL — SIGNIFICANT CHANGE UP (ref 0–0.5)
EOSINOPHIL NFR BLD AUTO: 0.9 % — SIGNIFICANT CHANGE UP (ref 0–6)
GLUCOSE SERPL-MCNC: 97 MG/DL — SIGNIFICANT CHANGE UP (ref 70–99)
HCT VFR BLD CALC: 33 % — LOW (ref 39–50)
HGB BLD-MCNC: 10.9 G/DL — LOW (ref 13–17)
IMM GRANULOCYTES NFR BLD AUTO: 4.6 % — HIGH (ref 0–1.5)
LYMPHOCYTES # BLD AUTO: 0.9 K/UL — LOW (ref 1–3.3)
LYMPHOCYTES # BLD AUTO: 16.6 % — SIGNIFICANT CHANGE UP (ref 13–44)
MAGNESIUM SERPL-MCNC: 2.2 MG/DL — SIGNIFICANT CHANGE UP (ref 1.6–2.6)
MCHC RBC-ENTMCNC: 32 PG — SIGNIFICANT CHANGE UP (ref 27–34)
MCHC RBC-ENTMCNC: 33 % — SIGNIFICANT CHANGE UP (ref 32–36)
MCV RBC AUTO: 96.8 FL — SIGNIFICANT CHANGE UP (ref 80–100)
MONOCYTES # BLD AUTO: 0.68 K/UL — SIGNIFICANT CHANGE UP (ref 0–0.9)
MONOCYTES NFR BLD AUTO: 12.5 % — SIGNIFICANT CHANGE UP (ref 2–14)
NEUTROPHILS # BLD AUTO: 3.52 K/UL — SIGNIFICANT CHANGE UP (ref 1.8–7.4)
NEUTROPHILS NFR BLD AUTO: 64.8 % — SIGNIFICANT CHANGE UP (ref 43–77)
NRBC # FLD: 0 K/UL — SIGNIFICANT CHANGE UP (ref 0–0)
PLATELET # BLD AUTO: 226 K/UL — SIGNIFICANT CHANGE UP (ref 150–400)
PMV BLD: 9 FL — SIGNIFICANT CHANGE UP (ref 7–13)
POTASSIUM SERPL-MCNC: 5 MMOL/L — SIGNIFICANT CHANGE UP (ref 3.5–5.3)
POTASSIUM SERPL-SCNC: 5 MMOL/L — SIGNIFICANT CHANGE UP (ref 3.5–5.3)
RBC # BLD: 3.41 M/UL — LOW (ref 4.2–5.8)
RBC # FLD: 15 % — HIGH (ref 10.3–14.5)
SODIUM SERPL-SCNC: 133 MMOL/L — LOW (ref 135–145)
SPECIMEN SOURCE: SIGNIFICANT CHANGE UP
SPECIMEN SOURCE: SIGNIFICANT CHANGE UP
WBC # BLD: 5.43 K/UL — SIGNIFICANT CHANGE UP (ref 3.8–10.5)
WBC # FLD AUTO: 5.43 K/UL — SIGNIFICANT CHANGE UP (ref 3.8–10.5)

## 2019-06-30 PROCEDURE — 99232 SBSQ HOSP IP/OBS MODERATE 35: CPT

## 2019-06-30 PROCEDURE — 99233 SBSQ HOSP IP/OBS HIGH 50: CPT

## 2019-06-30 PROCEDURE — 71275 CT ANGIOGRAPHY CHEST: CPT | Mod: 26

## 2019-06-30 PROCEDURE — 93306 TTE W/DOPPLER COMPLETE: CPT | Mod: 26

## 2019-06-30 PROCEDURE — 93010 ELECTROCARDIOGRAM REPORT: CPT

## 2019-06-30 RX ORDER — AMIODARONE HYDROCHLORIDE 400 MG/1
200 TABLET ORAL DAILY
Refills: 0 | Status: DISCONTINUED | OUTPATIENT
Start: 2019-06-30 | End: 2019-07-01

## 2019-06-30 RX ORDER — AMIODARONE HYDROCHLORIDE 400 MG/1
150 TABLET ORAL ONCE
Refills: 0 | Status: COMPLETED | OUTPATIENT
Start: 2019-06-30 | End: 2019-06-30

## 2019-06-30 RX ORDER — AMINOPHYLLINE 100 MG
150 TABLET ORAL ONCE
Refills: 0 | Status: DISCONTINUED | OUTPATIENT
Start: 2019-06-30 | End: 2019-06-30

## 2019-06-30 RX ORDER — AMIODARONE HYDROCHLORIDE 400 MG/1
200 TABLET ORAL DAILY
Refills: 0 | Status: DISCONTINUED | OUTPATIENT
Start: 2019-06-30 | End: 2019-06-30

## 2019-06-30 RX ORDER — KETOROLAC TROMETHAMINE 30 MG/ML
15 SYRINGE (ML) INJECTION ONCE
Refills: 0 | Status: DISCONTINUED | OUTPATIENT
Start: 2019-06-30 | End: 2019-06-30

## 2019-06-30 RX ADMIN — ENOXAPARIN SODIUM 40 MILLIGRAM(S): 100 INJECTION SUBCUTANEOUS at 17:11

## 2019-06-30 RX ADMIN — Medication 81 MILLIGRAM(S): at 17:10

## 2019-06-30 RX ADMIN — Medication 100 MILLIGRAM(S): at 17:11

## 2019-06-30 RX ADMIN — TRAMADOL HYDROCHLORIDE 100 MILLIGRAM(S): 50 TABLET ORAL at 21:20

## 2019-06-30 RX ADMIN — TRAMADOL HYDROCHLORIDE 100 MILLIGRAM(S): 50 TABLET ORAL at 09:28

## 2019-06-30 RX ADMIN — AMIODARONE HYDROCHLORIDE 618 MILLIGRAM(S): 400 TABLET ORAL at 17:09

## 2019-06-30 RX ADMIN — Medication 1 GRAM(S): at 05:04

## 2019-06-30 RX ADMIN — PANTOPRAZOLE SODIUM 40 MILLIGRAM(S): 20 TABLET, DELAYED RELEASE ORAL at 05:04

## 2019-06-30 RX ADMIN — SODIUM CHLORIDE 75 MILLILITER(S): 9 INJECTION, SOLUTION INTRAVENOUS at 21:11

## 2019-06-30 RX ADMIN — AMIODARONE HYDROCHLORIDE 200 MILLIGRAM(S): 400 TABLET ORAL at 17:10

## 2019-06-30 RX ADMIN — Medication 100 MILLIGRAM(S): at 21:10

## 2019-06-30 RX ADMIN — ATORVASTATIN CALCIUM 40 MILLIGRAM(S): 80 TABLET, FILM COATED ORAL at 21:11

## 2019-06-30 RX ADMIN — TRAMADOL HYDROCHLORIDE 100 MILLIGRAM(S): 50 TABLET ORAL at 04:41

## 2019-06-30 RX ADMIN — CEFTRIAXONE 100 MILLIGRAM(S): 500 INJECTION, POWDER, FOR SOLUTION INTRAMUSCULAR; INTRAVENOUS at 17:56

## 2019-06-30 RX ADMIN — Medication 500 MILLIGRAM(S): at 17:12

## 2019-06-30 RX ADMIN — Medication 500 MILLIGRAM(S): at 18:00

## 2019-06-30 RX ADMIN — PANTOPRAZOLE SODIUM 40 MILLIGRAM(S): 20 TABLET, DELAYED RELEASE ORAL at 17:10

## 2019-06-30 RX ADMIN — Medication 100 MILLIGRAM(S): at 05:04

## 2019-06-30 RX ADMIN — TRAMADOL HYDROCHLORIDE 100 MILLIGRAM(S): 50 TABLET ORAL at 10:10

## 2019-06-30 RX ADMIN — SENNA PLUS 2 TABLET(S): 8.6 TABLET ORAL at 21:11

## 2019-06-30 RX ADMIN — Medication 500 MILLIGRAM(S): at 12:00

## 2019-06-30 RX ADMIN — TRAMADOL HYDROCHLORIDE 100 MILLIGRAM(S): 50 TABLET ORAL at 22:10

## 2019-06-30 RX ADMIN — Medication 500 MILLIGRAM(S): at 11:21

## 2019-06-30 RX ADMIN — TRAMADOL HYDROCHLORIDE 100 MILLIGRAM(S): 50 TABLET ORAL at 03:41

## 2019-06-30 RX ADMIN — LIDOCAINE 2 PATCH: 4 CREAM TOPICAL at 05:01

## 2019-06-30 RX ADMIN — Medication 1 GRAM(S): at 17:10

## 2019-06-30 RX ADMIN — SODIUM CHLORIDE 75 MILLILITER(S): 9 INJECTION, SOLUTION INTRAVENOUS at 17:11

## 2019-06-30 RX ADMIN — LIDOCAINE 2 PATCH: 4 CREAM TOPICAL at 21:10

## 2019-06-30 NOTE — PROVIDER CONTACT NOTE (OTHER) - SITUATION
73 year old male admitted on 6/27 with LE weakness.
73 year old male admitted on 6/27 with LE weakness.
patient back in rapid Afib

## 2019-06-30 NOTE — PROGRESS NOTE ADULT - ASSESSMENT
&3 year old with history of PMR and prostate cancer presents with right lower extremity pain.  He has associated arthralgias.     He does not appear toxic.     My concern for infection is not high but elevated bands and procalcitonin are concerning    1) Abnormal imaging of the right leg    Consider MRI of the lag      2) Bandemia with elevated procalcitonin    Hard to interpret in absence of more focal exam and abscence of fever  Blood culture without growth   Repeat differential is okay      3) Joint pain  Polyarticular  Check lyme  Check parvovirus pcr    S/p arthrocentesis cell count from ankle and knee are both less that 25 K    I would expect him to be sicker with polyarticular septic arthritis.  I would also expect WBC cell count to be higher    In light of fever, can give ceftriaxone pending culture results. If cultures are negative at 48 hours, stop abx    4) Cough and abd pain  No pna on Ct  Could this be serositis

## 2019-06-30 NOTE — PROGRESS NOTE ADULT - ASSESSMENT
73 year old M PMH Polymyalgia rheumatica (inactive for several years), Prostate Ca s/p prostatectomy sees doctors regularly , HLD presented with progressive RLE swelling and pain a/w inability to ambulate- sono of leg w/ 4cm cyst vs hematoma in calf- d/w radiology ?hematoma- MRI tib/fib ordered;  pt w/ night sweats, elev CRP and procalcitonin- ID called  given elev BUN, steroids and nsaids recently, with anemia- GI called  Rheum consulted as well for h/o PMR- suspecting reactive arthritis- awaiting RA studies (anti ccp)     6/29- new onset rapid afib- converted to sinus w amiodarone- neg trops; on tele now  +fever - cx neg thus far  6/30- CT scans neg for pna, PE or other infectious source

## 2019-06-30 NOTE — PROGRESS NOTE ADULT - PROBLEM SELECTOR PLAN 1
likely stress response to underlying inflammation vs. ?infection in body  seen by cards  amiodarone load initiated  tele for now  pro BNP elevated- pt w/ minimal po intake w/ soft bp's- will watch for now  was in sinus 6/29 now back in afib

## 2019-06-30 NOTE — PROGRESS NOTE ADULT - ASSESSMENT
ugib  melena    monitor cbc, transfuse prn,  carafate 1 gram bid  PPI qd  hold A/C and NSAIDs  EGD once optimized on monday  pulmonary f/u needed

## 2019-06-30 NOTE — PROVIDER CONTACT NOTE (OTHER) - ASSESSMENT
Upon auscultation, irregular HR heard. 12 lead EKG performed, rapid afib noted. Patient with stable vital signs. Patient denies chest pain and SOB.
Patient noted to convert to NSR on tele at this time. EKG to confirm
patient resting in the bed, asymptomatic at this time- placed on 2L NC, afib on the monitor 120-130s

## 2019-06-30 NOTE — PROGRESS NOTE ADULT - SUBJECTIVE AND OBJECTIVE BOX
Follow Up:      Inverval History/ROS:Patient is a 73y old  Male who presents with a chief complaint of RLE pain (2019 09:37)    Afebrile    C/o abd pain  Still has cough.     Allergies    No Known Allergies    Intolerances        ANTIMICROBIALS:  cefTRIAXone   IVPB 2000 every 24 hours      OTHER MEDS:  acetaminophen   Tablet .. 650 milliGRAM(s) Oral every 6 hours PRN  acetaminophen   Tablet .. 650 milliGRAM(s) Oral every 6 hours PRN  amiodarone Infusion 1 mG/Min IV Continuous <Continuous>  amiodarone Infusion 0.5 mG/Min IV Continuous <Continuous>  aspirin enteric coated 81 milliGRAM(s) Oral daily  atorvastatin 40 milliGRAM(s) Oral at bedtime  docusate sodium 100 milliGRAM(s) Oral three times a day  enoxaparin Injectable 40 milliGRAM(s) SubCutaneous daily  ethyl chloride Spray 1 Application(s) Topical once  lactated ringers. 1000 milliLiter(s) IV Continuous <Continuous>  lidocaine   Patch 2 Patch Transdermal daily  lidocaine 1% Injectable 2 milliLiter(s) Local Injection once  lidocaine 2% Injectable 1 milliLiter(s) Local Injection once  naproxen 500 milliGRAM(s) Oral two times a day  pantoprazole    Tablet 40 milliGRAM(s) Oral two times a day  polyethylene glycol 3350 17 Gram(s) Oral daily PRN  senna 2 Tablet(s) Oral at bedtime  sucralfate 1 Gram(s) Oral two times a day  traMADol 100 milliGRAM(s) Oral every 6 hours PRN      Vital Signs Last 24 Hrs  T(C): 37.1 (2019 03:51), Max: 38.3 (2019 12:24)  T(F): 98.7 (2019 03:51), Max: 100.9 (2019 12:24)  HR: 75 (2019 09:30) (66 - 111)  BP: 130/77 (2019 09:30) (100/60 - 140/59)  BP(mean): --  RR: 16 (2019 09:30) (16 - 18)  SpO2: 95% (2019 09:30) (93% - 99%)    PHYSICAL EXAM:  General: [x ] non-toxic  HEAD/EYES: [ ] PERRL [x ] white sclera [ ] icterus  ENT:  [ ] normal [x ] supple [ ] thrush [ ] pharyngeal exudate  Cardiovascular:   [ ] murmur [x ] normal [ ] PPM/AICD  Respiratory:  [x ] clear to ausculation bilaterally  GI:  [x ] soft, non-tender, normal bowel sounds  :  [ ] hamlin [ ] no CVA tenderness   Musculoskeletal:  [x ] B knee effusion  pain with rom b knees right ankle    Neurologic:  [ ] non-focal exam   Skin:  [x ] no rash  Lymph: x[ ] no lymphadenopathy  Psychiatric:  [x ] appropriate affect [ ] alert & oriented  Lines:  x ] no phlebitis [ ] central line                                10.9   5.43  )-----------( 226      ( 2019 07:00 )             33.0       06-30    133<L>  |  97<L>  |  31<H>  ----------------------------<  97  5.0   |  22  |  0.90    Ca    9.7      2019 07:00  Mg     2.2             Urinalysis Basic - ( 2019 12:40 )    Color: YELLOW / Appearance: CLEAR / S.032 / pH: 6.0  Gluc: NEGATIVE / Ketone: TRACE  / Bili: NEGATIVE / Urobili: NORMAL   Blood: NEGATIVE / Protein: 200 / Nitrite: NEGATIVE   Leuk Esterase: NEGATIVE / RBC: 0-2 / WBC 0-2   Sq Epi: OCC / Non Sq Epi: x / Bacteria: NEGATIVE        MICROBIOLOGY:Culture - Surgical Site:   NO ORGANISMS ISOLATED AT 24 HOURS (19 @ 21:31)  Culture - Surgical Site:   NO ORGANISMS ISOLATED AT 24 HOURS (19 @ 21:29)      RADIOLOGY:    < from: CT Abdomen and Pelvis w/ IV Cont (19 @ 19:11) >  ******PRELIMINARY REPORT******    ******PRELIMINARY REPORT******            EXAM:  CT ABDOMEN AND PELVIS IC        PROCEDURE DATE:  2019     ******PRELIMINARY REPORT******    ******PRELIMINARY REPORT******            INTERPRETATION:  No focal consolidation in the lungs. Small right pleural   effusion. Discussed with Dr. Fitzpatrick    < end of copied text >    images reviewed by me with radiologist

## 2019-06-30 NOTE — PROGRESS NOTE ADULT - SUBJECTIVE AND OBJECTIVE BOX
Subjective: Patient seen and examined. No new events except as noted.     SUBJECTIVE/ROS:  co of chest pain , right sided pain around back and right Upper quad         MEDICATIONS:  MEDICATIONS  (STANDING):  amiodarone Infusion 1 mG/Min (33.333 mL/Hr) IV Continuous <Continuous>  amiodarone Infusion 0.5 mG/Min (16.667 mL/Hr) IV Continuous <Continuous>  aspirin enteric coated 81 milliGRAM(s) Oral daily  atorvastatin 40 milliGRAM(s) Oral at bedtime  cefTRIAXone   IVPB 2000 milliGRAM(s) IV Intermittent every 24 hours  docusate sodium 100 milliGRAM(s) Oral three times a day  enoxaparin Injectable 40 milliGRAM(s) SubCutaneous daily  ethyl chloride Spray 1 Application(s) Topical once  ketorolac   Injectable 15 milliGRAM(s) IV Push once  lactated ringers. 1000 milliLiter(s) (75 mL/Hr) IV Continuous <Continuous>  lidocaine   Patch 2 Patch Transdermal daily  lidocaine 1% Injectable 2 milliLiter(s) Local Injection once  lidocaine 2% Injectable 1 milliLiter(s) Local Injection once  pantoprazole    Tablet 40 milliGRAM(s) Oral two times a day  senna 2 Tablet(s) Oral at bedtime  sucralfate 1 Gram(s) Oral two times a day      PHYSICAL EXAM:  T(C): 37.1 (06-30-19 @ 03:51), Max: 38.3 (06-29-19 @ 12:24)  HR: 75 (06-30-19 @ 09:30) (66 - 123)  BP: 130/77 (06-30-19 @ 09:30) (100/60 - 140/59)  RR: 16 (06-30-19 @ 09:30) (16 - 18)  SpO2: 95% (06-30-19 @ 09:30) (93% - 99%)  Wt(kg): --  I&O's Summary          JVP: Normal  Neck: supple  Lung: loud rhonchus on right side   CV: S1 S2 , Murmur: subtle friction rub   Abd: soft  Ext: No edema  neuro: Awake / alert  Psych: flat affect  Skin: normal``    LABS/DATA:    CARDIAC MARKERS:  CARDIAC MARKERS ( 29 Jun 2019 12:35 )  x     / x     / 120 u/L / x     / x      CARDIAC MARKERS ( 29 Jun 2019 05:45 )  x     / x     / 167 u/L / x     / x                                    10.9   5.43  )-----------( 226      ( 30 Jun 2019 07:00 )             33.0     06-30    133<L>  |  97<L>  |  31<H>  ----------------------------<  97  5.0   |  22  |  0.90    Ca    9.7      30 Jun 2019 07:00  Mg     2.2     06-30      proBNP: Serum Pro-Brain Natriuretic Peptide: 3534 pg/mL (06-29 @ 12:35)    Lipid Profile:   HgA1c:   TSH:     TELE:  EKG:

## 2019-06-30 NOTE — PROVIDER CONTACT NOTE (OTHER) - REASON
patient back in rapid Afib
patient converted to NSR on tele in 80's.
Patient with new onset rapid afib

## 2019-06-30 NOTE — PROGRESS NOTE ADULT - ASSESSMENT
highly inflammatory vs infectious syndrome   with significant pleurisy and pain   possible underlying pericarditis as well    fu with ID and rheum work up   consider NSAIDs for pain control   D Dimer is high , possible due to underlying above undiagnosed syndrome , agree with ctpa to rule out PE   obtain echo    afib  converted to sinus  cont amio , change to po tomorrow 200 daily     anemia  plan for EGD  will hold off for now until more stable

## 2019-06-30 NOTE — PROGRESS NOTE ADULT - SUBJECTIVE AND OBJECTIVE BOX
Dr. Fitzpatrick pager [phone]    Patient is a 73y old  Male who presents with a chief complaint of RLE pain (2019 12:21)      SUBJECTIVE / OVERNIGHT EVENTS: pt seen at 1030a- per wife at bedside pt had more color today- pt still w/ chest discomfort, worse w/ cough and deep breathing.  some abd pain- band like.  No n/v; still w/ pain in rt calf      MEDICATIONS  (STANDING):  amiodarone    Tablet 200 milliGRAM(s) Oral daily  amiodarone Infusion 1 mG/Min (33.333 mL/Hr) IV Continuous <Continuous>  amiodarone Infusion 0.5 mG/Min (16.667 mL/Hr) IV Continuous <Continuous>  amiodarone IVPB 150 milliGRAM(s) IV Intermittent once  aspirin enteric coated 81 milliGRAM(s) Oral daily  atorvastatin 40 milliGRAM(s) Oral at bedtime  cefTRIAXone   IVPB 2000 milliGRAM(s) IV Intermittent every 24 hours  docusate sodium 100 milliGRAM(s) Oral three times a day  enoxaparin Injectable 40 milliGRAM(s) SubCutaneous daily  ethyl chloride Spray 1 Application(s) Topical once  lactated ringers. 1000 milliLiter(s) (75 mL/Hr) IV Continuous <Continuous>  lidocaine   Patch 2 Patch Transdermal daily  lidocaine 1% Injectable 2 milliLiter(s) Local Injection once  lidocaine 2% Injectable 1 milliLiter(s) Local Injection once  naproxen 500 milliGRAM(s) Oral two times a day  pantoprazole    Tablet 40 milliGRAM(s) Oral two times a day  senna 2 Tablet(s) Oral at bedtime  sucralfate 1 Gram(s) Oral two times a day    MEDICATIONS  (PRN):  acetaminophen   Tablet .. 650 milliGRAM(s) Oral every 6 hours PRN Mild Pain (1 - 3)  acetaminophen   Tablet .. 650 milliGRAM(s) Oral every 6 hours PRN Temp greater or equal to 38C (100.4F)  polyethylene glycol 3350 17 Gram(s) Oral daily PRN Constipation  traMADol 100 milliGRAM(s) Oral every 6 hours PRN moderate to severe pain      Meds ordered within last 24hours  ketorolac   Injectable: [Ordered as TORADOL Injectable]  15 milliGRAM(s), IV Push, once, Stop After 1 Doses  Administration Instructions: IF IV PUSH, ADMINISTER OVER 1 MINUTE (06-30 @ 09:29)  naproxen: [Known as NAPROSYN]  500 milliGRAM(s), Oral, two times a day  Provider's Contact #: [phone] ( @ 10:17)  amiodarone    Tablet: [Known as PACERONE]  200 milliGRAM(s), Oral, daily  Special Instructions: hold for <55 ( @ 15:33)  aminophylline IVPB:   150 milliGRAM(s) in dextrose 5% 50 milliLiter(s), IV Intermittent, once, infuse over 30 Minute(s), Stop After 1 Doses ( @ 15:33)  amiodarone IVPB:   150 milliGRAM(s) in Excel bag dextrose 5% 100 milliLiter(s), IV Intermittent, once, infuse over 10 Minute(s), Stop After 1 Doses  Administration Instructions: Use an in-line filter. ( @ 16:14)      T(C): 35.9 (19 @ 16:00), Max: 37.2 (19 @ 23:51)  HR: 130 (19 @ 15:00) (66 - 130)  BP: 128/65 (19 @ 15:00) (113/59 - 140/59)  RR: 16 (19 @ 15:00) (16 - 18)  SpO2: 98% (19 @ 15:00) (93% - 98%)    CAPILLARY BLOOD GLUCOSE        I&O's Summary      PHYSICAL EXAM:  GENERAL: NAD able to pull himself up in bed  CHEST/LUNG: coarse bs at bases  HEART: Regular rate and rhythm; No murmurs, rubs, or gallops  ABDOMEN: Soft, Nontender, Nondistended; Bowel sounds present  EXTREMITIES:  No clubbing, cyanosis, or edema  NEURO: A&Ox3      LABS:                        10.9   5.43  )-----------( 226      ( 2019 07:00 )             33.0         133<L>  |  97<L>  |  31<H>  ----------------------------<  97  5.0   |  22  |  0.90    Ca    9.7      2019 07:00  Mg     2.2     06-30        CARDIAC MARKERS ( 2019 12:35 )  x     / x     / 120 u/L / x     / x      CARDIAC MARKERS ( 2019 05:45 )  x     / x     / 167 u/L / x     / x          Urinalysis Basic - ( 2019 12:40 )    Color: YELLOW / Appearance: CLEAR / S.032 / pH: 6.0  Gluc: NEGATIVE / Ketone: TRACE  / Bili: NEGATIVE / Urobili: NORMAL   Blood: NEGATIVE / Protein: 200 / Nitrite: NEGATIVE   Leuk Esterase: NEGATIVE / RBC: 0-2 / WBC 0-2   Sq Epi: OCC / Non Sq Epi: x / Bacteria: NEGATIVE        RADIOLOGY & ADDITIONAL TESTS:    Imaging Personally Reviewed:< from: CT Angio Chest w/ IV Cont (19 @ 13:05) >  IMPRESSION:     No pulmonary embolus.      < end of copied text >  < from: CT Abdomen and Pelvis w/ IV Cont (19 @ 19:11) >    CHEST:    Subsegmental atelectasis. No focal mass.    ABDOMEN PELVIS:    No lymphadenopathy in the abdomen or pelvis.        < end of copied text >      Consultant(s) Notes Reviewed:      Care Discussed with Consultants/Other Providers:

## 2019-06-30 NOTE — CHART NOTE - NSCHARTNOTEFT_GEN_A_CORE
notified by RN that PT went into AFIB 's. Pt is S/p amio infusion, was bearing down to urinate at the time of the episode. Pt denies any palpitations, dizziness, N/V or any new Chest pain.     - Amio 150 IVP and Amio 200 PO ordered stat.  - will continue to monitor   - discussed plan with Dr. Morris    ICU Vital Signs Last 24 Hrs  T(C): 36.7 (30 Jun 2019 13:14), Max: 37.2 (29 Jun 2019 23:51)  T(F): 98 (30 Jun 2019 13:14), Max: 98.9 (29 Jun 2019 23:51)  HR: 140 (30 Jun 2019 15:00) (66 - 140)  BP: 128/65 (30 Jun 2019 15:00) (102/60 - 140/59)  RR: 16 (30 Jun 2019 15:00) (16 - 18)  SpO2: 98% (30 Jun 2019 15:00) (93% - 98%)

## 2019-06-30 NOTE — PROGRESS NOTE ADULT - SUBJECTIVE AND OBJECTIVE BOX
INTERVAL HPI/OVERNIGHT EVENTS:  No new overnight event.  No N/V/D.  Tolerating diet.  no melena some sob today    Allergies    No Known Allergies    Intolerances    General:  No wt loss, fevers, chills, night sweats, fatigue,   Eyes:  Good vision, no reported pain  ENT:  No sore throat, pain, runny nose, dysphagia  CV:  No pain, palpitations, hypo/hypertension  Resp:  No dyspnea, cough, tachypnea, wheezing  GI:  No pain, No nausea, No vomiting, No diarrhea, No constipation, No weight loss, No fever, No pruritis, No rectal bleeding, No tarry stools, No dysphagia,  :  No pain, bleeding, incontinence, nocturia  Muscle:  No pain, weakness  Neuro:  No weakness, tingling, memory problems  Psych:  No fatigue, insomnia, mood problems, depression  Endocrine:  No polyuria, polydipsia, cold/heat intolerance  Heme:  No petechiae, ecchymosis, easy bruisability  Skin:  No rash, tattoos, scars, edema      PHYSICAL EXAM:   Vital Signs:  Vital Signs Last 24 Hrs  T(C): 35.9 (2019 16:00), Max: 37.2 (2019 23:51)  T(F): 96.6 (2019 16:00), Max: 98.9 (2019 23:51)  HR: 130 (2019 15:00) (66 - 130)  BP: 128/65 (2019 15:00) (113/59 - 140/59)  BP(mean): --  RR: 16 (2019 15:00) (16 - 18)  SpO2: 98% (2019 15:00) (93% - 98%)  Daily     Daily I&O's Summary      GENERAL:  Appears stated age, well-groomed, well-nourished, no distress  HEENT:  NC/AT,  conjunctivae clear and pink, no thyromegaly, nodules, adenopathy, no JVD, sclera -anicteric  CHEST:  Full & symmetric excursion, no increased effort, breath sounds clear  HEART:  Regular rhythm, S1, S2, no murmur/rub/S3/S4, no abdominal bruit, no edema  ABDOMEN:  Soft, non-tender, non-distended, normoactive bowel sounds,  no masses ,no hepato-splenomegaly, no signs of chronic liver disease  EXTEREMITIES:  no cyanosis,clubbing or edema  SKIN:  No rash/erythema/ecchymoses/petechiae/wounds/abscess/warm/dry  NEURO:  Alert, oriented, no asterixis, no tremor, no encephalopathy      LABS:                        10.9   5.43  )-----------( 226      ( 2019 07:00 )             33.0     06-30    133<L>  |  97<L>  |  31<H>  ----------------------------<  97  5.0   |  22  |  0.90    Ca    9.7      2019 07:00  Mg     2.2     06-30        Urinalysis Basic - ( 2019 12:40 )    Color: YELLOW / Appearance: CLEAR / S.032 / pH: 6.0  Gluc: NEGATIVE / Ketone: TRACE  / Bili: NEGATIVE / Urobili: NORMAL   Blood: NEGATIVE / Protein: 200 / Nitrite: NEGATIVE   Leuk Esterase: NEGATIVE / RBC: 0-2 / WBC 0-2   Sq Epi: OCC / Non Sq Epi: x / Bacteria: NEGATIVE      amylase   lipase  RADIOLOGY & ADDITIONAL TESTS:

## 2019-06-30 NOTE — CHART NOTE - NSCHARTNOTEFT_GEN_A_CORE
Informed by nurse that patient was complaining of chest pain when taking deep breath. Patient was seen and examined at bedside. Patient says pain has spread to Right upper quadrant and lower back. On physical exam patient is alert and oriented x3. Cardiovascular S1,S2 heard, no murmurs, gallops or rubs. Lungs: CTAB, ABD: Soft, non-distended, normoactive bowel sounds, No tenderness on palpation of abdomen. No CVA tenderness.    Vital Signs Last 24 Hrs  T(C): 37.1 (30 Jun 2019 03:51), Max: 38.3 (29 Jun 2019 12:24)  T(F): 98.7 (30 Jun 2019 03:51), Max: 100.9 (29 Jun 2019 12:24)  HR: 78 (30 Jun 2019 03:51) (66 - 123)  BP: 140/59 (30 Jun 2019 03:51) (93/52 - 140/59)  BP(mean): --  RR: 18 (30 Jun 2019 03:51) (16 - 18)  SpO2: 96% (30 Jun 2019 03:51) (93% - 99%)    EKG: NSR: 85 bpm with QT/QTc 380/452.    Will continue to monitor.

## 2019-06-30 NOTE — PROVIDER CONTACT NOTE (OTHER) - BACKGROUND
PMH of prostate cancer, myalgias, HLD, HTN
PMH of prostate cancer, myalgias, HLD, HTN
patient admitted for RLE swelling PMH- RA, prostate CA

## 2019-07-01 LAB
ANION GAP SERPL CALC-SCNC: 15 MMO/L — HIGH (ref 7–14)
ANISOCYTOSIS BLD QL: SLIGHT — SIGNIFICANT CHANGE UP
BASOPHILS # BLD AUTO: 0.04 K/UL — SIGNIFICANT CHANGE UP (ref 0–0.2)
BASOPHILS NFR BLD AUTO: 0.8 % — SIGNIFICANT CHANGE UP (ref 0–2)
BASOPHILS NFR SPEC: 0 % — SIGNIFICANT CHANGE UP (ref 0–2)
BUN SERPL-MCNC: 24 MG/DL — HIGH (ref 7–23)
BURR CELLS BLD QL SMEAR: PRESENT — SIGNIFICANT CHANGE UP
CALCIUM SERPL-MCNC: 9.4 MG/DL — SIGNIFICANT CHANGE UP (ref 8.4–10.5)
CHLORIDE SERPL-SCNC: 97 MMOL/L — LOW (ref 98–107)
CO2 SERPL-SCNC: 22 MMOL/L — SIGNIFICANT CHANGE UP (ref 22–31)
CREAT ?TM UR-MCNC: 114.2 MG/DL — SIGNIFICANT CHANGE UP
CREAT SERPL-MCNC: 0.86 MG/DL — SIGNIFICANT CHANGE UP (ref 0.5–1.3)
EOSINOPHIL # BLD AUTO: 0.09 K/UL — SIGNIFICANT CHANGE UP (ref 0–0.5)
EOSINOPHIL NFR BLD AUTO: 1.9 % — SIGNIFICANT CHANGE UP (ref 0–6)
EOSINOPHIL NFR FLD: 0 % — SIGNIFICANT CHANGE UP (ref 0–6)
GLUCOSE SERPL-MCNC: 81 MG/DL — SIGNIFICANT CHANGE UP (ref 70–99)
HCT VFR BLD CALC: 34.9 % — LOW (ref 39–50)
HGB BLD-MCNC: 11.5 G/DL — LOW (ref 13–17)
IMM GRANULOCYTES NFR BLD AUTO: 4.6 % — HIGH (ref 0–1.5)
LYMPHOCYTES # BLD AUTO: 1.02 K/UL — SIGNIFICANT CHANGE UP (ref 1–3.3)
LYMPHOCYTES # BLD AUTO: 21.3 % — SIGNIFICANT CHANGE UP (ref 13–44)
LYMPHOCYTES NFR SPEC AUTO: 19 % — SIGNIFICANT CHANGE UP (ref 13–44)
MACROCYTES BLD QL: SLIGHT — SIGNIFICANT CHANGE UP
MAGNESIUM SERPL-MCNC: 2.2 MG/DL — SIGNIFICANT CHANGE UP (ref 1.6–2.6)
MANUAL SMEAR VERIFICATION: SIGNIFICANT CHANGE UP
MCHC RBC-ENTMCNC: 31.5 PG — SIGNIFICANT CHANGE UP (ref 27–34)
MCHC RBC-ENTMCNC: 33 % — SIGNIFICANT CHANGE UP (ref 32–36)
MCV RBC AUTO: 95.6 FL — SIGNIFICANT CHANGE UP (ref 80–100)
MONOCYTES # BLD AUTO: 0.97 K/UL — HIGH (ref 0–0.9)
MONOCYTES NFR BLD AUTO: 20.3 % — HIGH (ref 2–14)
MONOCYTES NFR BLD: 26 % — HIGH (ref 2–9)
MYELOCYTES NFR BLD: 2 % — HIGH (ref 0–0)
NEUTROPHIL AB SER-ACNC: 49 % — SIGNIFICANT CHANGE UP (ref 43–77)
NEUTROPHILS # BLD AUTO: 2.45 K/UL — SIGNIFICANT CHANGE UP (ref 1.8–7.4)
NEUTROPHILS NFR BLD AUTO: 51.1 % — SIGNIFICANT CHANGE UP (ref 43–77)
NEUTS BAND # BLD: 3 % — SIGNIFICANT CHANGE UP (ref 0–6)
NRBC # BLD: 0 /100WBC — SIGNIFICANT CHANGE UP
NRBC # FLD: 0 K/UL — SIGNIFICANT CHANGE UP (ref 0–0)
PLATELET # BLD AUTO: 205 K/UL — SIGNIFICANT CHANGE UP (ref 150–400)
PLATELET COUNT - ESTIMATE: NORMAL — SIGNIFICANT CHANGE UP
PMV BLD: 9 FL — SIGNIFICANT CHANGE UP (ref 7–13)
POTASSIUM SERPL-MCNC: 4.2 MMOL/L — SIGNIFICANT CHANGE UP (ref 3.5–5.3)
POTASSIUM SERPL-SCNC: 4.2 MMOL/L — SIGNIFICANT CHANGE UP (ref 3.5–5.3)
PROT UR-MCNC: 64.6 MG/DL — SIGNIFICANT CHANGE UP
RBC # BLD: 3.65 M/UL — LOW (ref 4.2–5.8)
RBC # FLD: 14.9 % — HIGH (ref 10.3–14.5)
SODIUM SERPL-SCNC: 134 MMOL/L — LOW (ref 135–145)
VARIANT LYMPHS # BLD: 1 % — SIGNIFICANT CHANGE UP
WBC # BLD: 4.79 K/UL — SIGNIFICANT CHANGE UP (ref 3.8–10.5)
WBC # FLD AUTO: 4.79 K/UL — SIGNIFICANT CHANGE UP (ref 3.8–10.5)

## 2019-07-01 PROCEDURE — 99232 SBSQ HOSP IP/OBS MODERATE 35: CPT | Mod: GC

## 2019-07-01 PROCEDURE — 73721 MRI JNT OF LWR EXTRE W/O DYE: CPT | Mod: 26,RT

## 2019-07-01 PROCEDURE — 99233 SBSQ HOSP IP/OBS HIGH 50: CPT

## 2019-07-01 PROCEDURE — 73718 MRI LOWER EXTREMITY W/O DYE: CPT | Mod: 26,RT

## 2019-07-01 PROCEDURE — 93010 ELECTROCARDIOGRAM REPORT: CPT

## 2019-07-01 PROCEDURE — 99232 SBSQ HOSP IP/OBS MODERATE 35: CPT

## 2019-07-01 RX ORDER — AMIODARONE HYDROCHLORIDE 400 MG/1
200 TABLET ORAL
Refills: 0 | Status: DISCONTINUED | OUTPATIENT
Start: 2019-07-01 | End: 2019-07-03

## 2019-07-01 RX ORDER — COLCHICINE 0.6 MG
0.6 TABLET ORAL ONCE
Refills: 0 | Status: DISCONTINUED | OUTPATIENT
Start: 2019-07-01 | End: 2019-07-01

## 2019-07-01 RX ORDER — COLCHICINE 0.6 MG
1.2 TABLET ORAL ONCE
Refills: 0 | Status: COMPLETED | OUTPATIENT
Start: 2019-07-01 | End: 2019-07-01

## 2019-07-01 RX ADMIN — ATORVASTATIN CALCIUM 40 MILLIGRAM(S): 80 TABLET, FILM COATED ORAL at 22:59

## 2019-07-01 RX ADMIN — Medication 100 MILLIGRAM(S): at 22:59

## 2019-07-01 RX ADMIN — SODIUM CHLORIDE 75 MILLILITER(S): 9 INJECTION, SOLUTION INTRAVENOUS at 11:04

## 2019-07-01 RX ADMIN — Medication 500 MILLIGRAM(S): at 12:00

## 2019-07-01 RX ADMIN — LIDOCAINE 2 PATCH: 4 CREAM TOPICAL at 09:00

## 2019-07-01 RX ADMIN — LIDOCAINE 2 PATCH: 4 CREAM TOPICAL at 19:29

## 2019-07-01 RX ADMIN — Medication 1.2 MILLIGRAM(S): at 15:28

## 2019-07-01 RX ADMIN — LIDOCAINE 2 PATCH: 4 CREAM TOPICAL at 11:05

## 2019-07-01 RX ADMIN — TRAMADOL HYDROCHLORIDE 100 MILLIGRAM(S): 50 TABLET ORAL at 19:56

## 2019-07-01 RX ADMIN — ENOXAPARIN SODIUM 40 MILLIGRAM(S): 100 INJECTION SUBCUTANEOUS at 11:06

## 2019-07-01 RX ADMIN — TRAMADOL HYDROCHLORIDE 100 MILLIGRAM(S): 50 TABLET ORAL at 20:40

## 2019-07-01 RX ADMIN — Medication 500 MILLIGRAM(S): at 23:00

## 2019-07-01 RX ADMIN — SODIUM CHLORIDE 75 MILLILITER(S): 9 INJECTION, SOLUTION INTRAVENOUS at 20:03

## 2019-07-01 RX ADMIN — LIDOCAINE 2 PATCH: 4 CREAM TOPICAL at 06:13

## 2019-07-01 RX ADMIN — Medication 1 GRAM(S): at 17:29

## 2019-07-01 RX ADMIN — AMIODARONE HYDROCHLORIDE 200 MILLIGRAM(S): 400 TABLET ORAL at 17:29

## 2019-07-01 RX ADMIN — LIDOCAINE 2 PATCH: 4 CREAM TOPICAL at 23:05

## 2019-07-01 RX ADMIN — SENNA PLUS 2 TABLET(S): 8.6 TABLET ORAL at 23:00

## 2019-07-01 RX ADMIN — PANTOPRAZOLE SODIUM 40 MILLIGRAM(S): 20 TABLET, DELAYED RELEASE ORAL at 17:29

## 2019-07-01 RX ADMIN — Medication 81 MILLIGRAM(S): at 11:05

## 2019-07-01 RX ADMIN — Medication 500 MILLIGRAM(S): at 11:05

## 2019-07-01 NOTE — PROGRESS NOTE ADULT - SUBJECTIVE AND OBJECTIVE BOX
Patient is a 73y old  Male who presents with a chief complaint of RLE pain       SUBJECTIVE / OVERNIGHT EVENTS: feeling over better; but still with pleuritic CP, abd discomfort, remains with poor appetite      MEDICATIONS  (STANDING):  amiodarone    Tablet 200 milliGRAM(s) Oral two times a day  amiodarone Infusion 1 mG/Min (33.333 mL/Hr) IV Continuous <Continuous>  amiodarone Infusion 0.5 mG/Min (16.667 mL/Hr) IV Continuous <Continuous>  aspirin enteric coated 81 milliGRAM(s) Oral daily  atorvastatin 40 milliGRAM(s) Oral at bedtime  cefTRIAXone   IVPB 2000 milliGRAM(s) IV Intermittent every 24 hours  colchicine 1.2 milliGRAM(s) Oral once  docusate sodium 100 milliGRAM(s) Oral three times a day  enoxaparin Injectable 40 milliGRAM(s) SubCutaneous daily  ethyl chloride Spray 1 Application(s) Topical once  lactated ringers. 1000 milliLiter(s) (75 mL/Hr) IV Continuous <Continuous>  lidocaine   Patch 2 Patch Transdermal daily  lidocaine 1% Injectable 2 milliLiter(s) Local Injection once  lidocaine 2% Injectable 1 milliLiter(s) Local Injection once  naproxen 500 milliGRAM(s) Oral two times a day  pantoprazole    Tablet 40 milliGRAM(s) Oral two times a day  senna 2 Tablet(s) Oral at bedtime  sucralfate 1 Gram(s) Oral two times a day    MEDICATIONS  (PRN):  acetaminophen   Tablet .. 650 milliGRAM(s) Oral every 6 hours PRN Mild Pain (1 - 3)  acetaminophen   Tablet .. 650 milliGRAM(s) Oral every 6 hours PRN Temp greater or equal to 38C (100.4F)  polyethylene glycol 3350 17 Gram(s) Oral daily PRN Constipation  traMADol 100 milliGRAM(s) Oral every 6 hours PRN moderate to severe pain      Vital Signs Last 24 Hrs  T(F): 97.6 (01 Jul 2019 06:12), Max: 97.7 (30 Jun 2019 21:18)  HR: 80 (01 Jul 2019 06:12) (77 - 130)  BP: 103/54 (01 Jul 2019 06:12) (103/54 - 128/65)  RR: 17 (01 Jul 2019 06:12) (16 - 17)  SpO2: 95% (01 Jul 2019 06:12) (95% - 98%)        PHYSICAL EXAM  GENERAL: NAD, well-developed  EYES: EOMI, PERRLA, conjunctiva and sclera clear  CHEST/LUNG: Clear to auscultation bilaterally;   HEART: Regular rate and rhythm;   ABDOMEN: Soft, Nontender, Nondistended; Bowel sounds present  EXTREMITIES:  2+ Peripheral Pulses, No clubbing, cyanosis, or edema      LABS:                        11.5   4.79  )-----------( 205      ( 01 Jul 2019 06:35 )             34.9     07-01    134<L>  |  97<L>  |  24<H>  ----------------------------<  81  4.2   |  22  |  0.86    Ca    9.4      01 Jul 2019 06:35  Mg     2.2     07-01                case d/w Dr Morris Patient is a 73y old  Male who presents with a chief complaint of RLE pain       SUBJECTIVE / OVERNIGHT EVENTS: feeling over better; but still with pleuritic CP, abd discomfort, remains with poor appetite      MEDICATIONS  (STANDING):  amiodarone    Tablet 200 milliGRAM(s) Oral two times a day  amiodarone Infusion 1 mG/Min (33.333 mL/Hr) IV Continuous <Continuous>  amiodarone Infusion 0.5 mG/Min (16.667 mL/Hr) IV Continuous <Continuous>  aspirin enteric coated 81 milliGRAM(s) Oral daily  atorvastatin 40 milliGRAM(s) Oral at bedtime  cefTRIAXone   IVPB 2000 milliGRAM(s) IV Intermittent every 24 hours  colchicine 1.2 milliGRAM(s) Oral once  docusate sodium 100 milliGRAM(s) Oral three times a day  enoxaparin Injectable 40 milliGRAM(s) SubCutaneous daily  ethyl chloride Spray 1 Application(s) Topical once  lactated ringers. 1000 milliLiter(s) (75 mL/Hr) IV Continuous <Continuous>  lidocaine   Patch 2 Patch Transdermal daily  lidocaine 1% Injectable 2 milliLiter(s) Local Injection once  lidocaine 2% Injectable 1 milliLiter(s) Local Injection once  naproxen 500 milliGRAM(s) Oral two times a day  pantoprazole    Tablet 40 milliGRAM(s) Oral two times a day  senna 2 Tablet(s) Oral at bedtime  sucralfate 1 Gram(s) Oral two times a day    MEDICATIONS  (PRN):  acetaminophen   Tablet .. 650 milliGRAM(s) Oral every 6 hours PRN Mild Pain (1 - 3)  acetaminophen   Tablet .. 650 milliGRAM(s) Oral every 6 hours PRN Temp greater or equal to 38C (100.4F)  polyethylene glycol 3350 17 Gram(s) Oral daily PRN Constipation  traMADol 100 milliGRAM(s) Oral every 6 hours PRN moderate to severe pain      Vital Signs Last 24 Hrs  T(F): 97.6 (01 Jul 2019 06:12), Max: 97.7 (30 Jun 2019 21:18)  HR: 80 (01 Jul 2019 06:12) (77 - 130)  BP: 103/54 (01 Jul 2019 06:12) (103/54 - 128/65)  RR: 17 (01 Jul 2019 06:12) (16 - 17)  SpO2: 95% (01 Jul 2019 06:12) (95% - 98%)        PHYSICAL EXAM  GENERAL: NAD, well-developed  EYES: EOMI, PERRLA, conjunctiva and sclera clear  CHEST/LUNG: crackles b/l bases  HEART: Regular rate and rhythm;   ABDOMEN: Soft, RUQ tenderness, Nondistended; Bowel sounds present  EXTREMITIES:  2+ Peripheral Pulses, R>L (stable)      LABS:                        11.5   4.79  )-----------( 205      ( 01 Jul 2019 06:35 )             34.9     07-01    134<L>  |  97<L>  |  24<H>  ----------------------------<  81  4.2   |  22  |  0.86    Ca    9.4      01 Jul 2019 06:35  Mg     2.2     07-01                case d/w Dr Morris

## 2019-07-01 NOTE — PROGRESS NOTE ADULT - SUBJECTIVE AND OBJECTIVE BOX
Follow Up:      Inverval History/ROS:Patient is a 73y old  Male who presents with a chief complaint of RLE pain (01 Jul 2019 11:58)    A fib overnight.  On amiodarone  No fever for over 24 hours    Allergies    No Known Allergies    Intolerances        ANTIMICROBIALS:  cefTRIAXone   IVPB 2000 every 24 hours      OTHER MEDS:  acetaminophen   Tablet .. 650 milliGRAM(s) Oral every 6 hours PRN  acetaminophen   Tablet .. 650 milliGRAM(s) Oral every 6 hours PRN  amiodarone    Tablet 200 milliGRAM(s) Oral two times a day  amiodarone Infusion 1 mG/Min IV Continuous <Continuous>  amiodarone Infusion 0.5 mG/Min IV Continuous <Continuous>  aspirin enteric coated 81 milliGRAM(s) Oral daily  atorvastatin 40 milliGRAM(s) Oral at bedtime  colchicine 1.2 milliGRAM(s) Oral once  docusate sodium 100 milliGRAM(s) Oral three times a day  enoxaparin Injectable 40 milliGRAM(s) SubCutaneous daily  ethyl chloride Spray 1 Application(s) Topical once  lactated ringers. 1000 milliLiter(s) IV Continuous <Continuous>  lidocaine   Patch 2 Patch Transdermal daily  lidocaine 1% Injectable 2 milliLiter(s) Local Injection once  lidocaine 2% Injectable 1 milliLiter(s) Local Injection once  naproxen 500 milliGRAM(s) Oral two times a day  pantoprazole    Tablet 40 milliGRAM(s) Oral two times a day  polyethylene glycol 3350 17 Gram(s) Oral daily PRN  senna 2 Tablet(s) Oral at bedtime  sucralfate 1 Gram(s) Oral two times a day  traMADol 100 milliGRAM(s) Oral every 6 hours PRN      Vital Signs Last 24 Hrs  T(C): 36.4 (01 Jul 2019 06:12), Max: 36.5 (30 Jun 2019 21:18)  T(F): 97.6 (01 Jul 2019 06:12), Max: 97.7 (30 Jun 2019 21:18)  HR: 80 (01 Jul 2019 06:12) (77 - 130)  BP: 103/54 (01 Jul 2019 06:12) (103/54 - 128/65)  BP(mean): --  RR: 17 (01 Jul 2019 06:12) (16 - 17)  SpO2: 95% (01 Jul 2019 06:12) (95% - 98%)    PHYSICAL EXAM:  General: [x ] non-toxic  HEAD/EYES: [ ] PERRL [x ] white sclera [ ] icterus  ENT:  [ ] normal [ ] supple [ ] thrush [ ] pharyngeal exudate  Cardiovascular:   [ ] murmur [x ] normal [ ] PPM/AICD  Respiratory:  [x ] clear to ausculation bilaterally  GI:  [ x] soft, non-tender, normal bowel sounds  :  [ ] hamlin [ ] no CVA tenderness   Musculoskeletal:  [ ] no synovitis  Neurologic:  [ ] non-focal exam   Skin:  [ ] no rash  Lymph: [ x] no lymphadenopathy  Psychiatric:  [ ] appropriate affect [ ] alert & oriented  Lines:  [x ] no phlebitis [ ] central line                                11.5   4.79  )-----------( 205      ( 01 Jul 2019 06:35 )             34.9       07-01    134<L>  |  97<L>  |  24<H>  ----------------------------<  81  4.2   |  22  |  0.86    Ca    9.4      01 Jul 2019 06:35  Mg     2.2     07-01            MICROBIOLOGY:Culture - Surgical Site:   NO ORGANISMS ISOLATED AT 24 HOURS  NO ORGANISMS ISOLATED AT 48 HRS. (06-28-19 @ 21:31)  Culture - Surgical Site:   NO ORGANISMS ISOLATED AT 24 HOURS  NO ORGANISMS ISOLATED AT 48 HRS. (06-28-19 @ 21:29)      RADIOLOGY:    Lyme serology negative    RF 23

## 2019-07-01 NOTE — PROGRESS NOTE ADULT - ASSESSMENT
highly inflammatory vs infectious syndrome   with significant pleurisy and pain   suspect acute pericarditis   fu with ID and rheum work up   agree with NSAIDs   start Colchicine   Echo personally reviewed     afib  converted to sinus  recurrent   will hold off to a/c as he may convert to hemorrhagic pleural / pericardial effusion   cont amio , change to 200 BID     anemia  plan for EGD  will hold off for now until more stable

## 2019-07-01 NOTE — CHART NOTE - NSCHARTNOTEFT_GEN_A_CORE
Informed by pharmacy that normal maintenance dose for Amiodarone is 100-200 mg QD. Amiodarone does was changed to 200 mg daily. Will continue to monitor.

## 2019-07-01 NOTE — PROGRESS NOTE ADULT - SUBJECTIVE AND OBJECTIVE BOX
MAURICE BEHAN  [id number]    INTERVAL HPI/OVERNIGHT EVENTS:\  Pt says that cough is about the same. Pt says L knee pain resolved. R knee pain still present, but feels less swollen. R ankle unchanged.     MEDICATIONS  (STANDING):  amiodarone    Tablet 200 milliGRAM(s) Oral two times a day  amiodarone Infusion 1 mG/Min (33.333 mL/Hr) IV Continuous <Continuous>  amiodarone Infusion 0.5 mG/Min (16.667 mL/Hr) IV Continuous <Continuous>  aspirin enteric coated 81 milliGRAM(s) Oral daily  atorvastatin 40 milliGRAM(s) Oral at bedtime  docusate sodium 100 milliGRAM(s) Oral three times a day  enoxaparin Injectable 40 milliGRAM(s) SubCutaneous daily  ethyl chloride Spray 1 Application(s) Topical once  lactated ringers. 1000 milliLiter(s) (75 mL/Hr) IV Continuous <Continuous>  lidocaine   Patch 2 Patch Transdermal daily  lidocaine 1% Injectable 2 milliLiter(s) Local Injection once  lidocaine 2% Injectable 1 milliLiter(s) Local Injection once  naproxen 500 milliGRAM(s) Oral two times a day  pantoprazole    Tablet 40 milliGRAM(s) Oral two times a day  senna 2 Tablet(s) Oral at bedtime  sucralfate 1 Gram(s) Oral two times a day    MEDICATIONS  (PRN):  acetaminophen   Tablet .. 650 milliGRAM(s) Oral every 6 hours PRN Mild Pain (1 - 3)  acetaminophen   Tablet .. 650 milliGRAM(s) Oral every 6 hours PRN Temp greater or equal to 38C (100.4F)  polyethylene glycol 3350 17 Gram(s) Oral daily PRN Constipation  traMADol 100 milliGRAM(s) Oral every 6 hours PRN moderate to severe pain      Allergies    No Known Allergies    Intolerances        Review of Systems:   per HPI otherwise negative.       Vital Signs Last 24 Hrs  T(C): 36.5 (01 Jul 2019 15:30), Max: 36.5 (30 Jun 2019 21:18)  T(F): 97.7 (01 Jul 2019 15:30), Max: 97.7 (30 Jun 2019 21:18)  HR: 81 (01 Jul 2019 15:30) (77 - 110)  BP: 105/42 (01 Jul 2019 15:30) (103/54 - 118/62)  BP(mean): --  RR: 18 (01 Jul 2019 15:30) (16 - 18)  SpO2: 95% (01 Jul 2019 15:30) (95% - 96%)    Physical Exam:  General: NAD  Cardio: +S1/S2, RRR  Resp: R lobar rales  GI: +BS, soft, NT/ND  MSK: R knee effusion, w restricted ROM 2/2 to pain. R ankle tenosynovitis. L knee pain FROM, no pain.   Neuro: AAOx3      LABS:                        11.5   4.79  )-----------( 205      ( 01 Jul 2019 06:35 )             34.9     07-01    134<L>  |  97<L>  |  24<H>  ----------------------------<  81  4.2   |  22  |  0.86    Ca    9.4      01 Jul 2019 06:35  Mg     2.2     07-01              RADIOLOGY & ADDITIONAL TESTS:      < from: CT Angio Chest w/ IV Cont (06.30.19 @ 13:05) >  XAM:  CT ANGIO CHEST (W)AW IC        PROCEDURE DATE:  Jun 30 2019         INTERPRETATION:  CLINICAL INFORMATION: Right lower extremity swelling and   pain with positive d-dimer. Evaluate for pulmonary embolism.     COMPARISON: CT chest abdomen pelvis 6/29/2019.    PROCEDURE:   CT Angiography of the Chest.  90 ml of Omnipaque 350 was injected intravenously. 10 ml were discarded.  Sagittal and coronal reformats were performed as well as 3D (MIP)   reconstructions.      FINDINGS:    LUNGS AND AIRWAYS: Upper lobe paraseptal emphysema/subpleural blebs.   Scattered areas of subsegmental atelectasis. Patent central airways.      PLEURA: Small bilateral pleural effusions.    MEDIASTINUM AND KIZZY: No lymphadenopathy.    PULMONARY ARTERY: No main, right and left main, lobar, segmental or   subsegmental pulmonary emboli.    VESSELS: Atherosclerotic calcification the aorta.    HEART: Heart size is normal. No pericardial effusion. Coronary artery   calcification. Aortic valve calcification.    CHEST WALL AND LOWER NECK: Within normal limits.    VISUALIZED UPPER ABDOMEN: Hepatic hypodensities some of which are too   small to characterize.    BONES: Degenerative changes of the spine.    IMPRESSION:     No pulmonary embolus.              WALLY GARCIA M.D., RADIOLOGY RESIDENT  This document has been electronically signed.  BETO DONG M.D., ATTENDING RADIOLOGIST  This document has been electronically signed. Jun 30 2019  1:55PM                  < end of copied text >    < from: CT Abdomen and Pelvis w/ IV Cont (06.29.19 @ 19:11) >  EXAM:  CT CHEST IC      EXAM:  CT ABDOMEN AND PELVIS IC        PROCEDURE DATE:  Jun 29 2019         INTERPRETATION:  CLINICAL INFORMATION: Prior history of polymyalgia   rheumatica and prostate cancer. Right lower extremity pain. Evaluate for   mass or pneumonia. Malignancy workup.    COMPARISON: None.    PROCEDURE:   CT of the Chest, Abdomen and Pelvis was performed with intravenous   contrast.   Intravenous contrast: 90 ml Omnipaque 350. 10 ml discarded.  Oral contrast: None.  Sagittal and coronal reformats were performed.    FINDINGS:    CHEST:     LUNGS AND LARGE AIRWAYS: Paraseptal emphysema in the upper lobes.   Scattered areas of subsegmental atelectasis. Patent central airways.   PLEURA: Small right pleural effusion. No pneumothorax.  VESSELS: Atherosclerotic changes of the aorta.   HEART: Heart size is normal. Trace pericardial effusion. Coronary artery   calcification. Aortic valve calcification.  MEDIASTINUM AND KIZZY: No lymphadenopathy.  CHEST WALL AND LOWER NECK: Bilateral gynecomastia.    ABDOMEN AND PELVIS:    LIVER: Scattered hypodensities some of which are too small to   characterize.  BILE DUCTS: Normal caliber.  GALLBLADDER: Within normal limits.  SPLEEN: Within normal limits.  PANCREAS: Within normal limits.  ADRENALS: Within normal limits.  KIDNEYS/URETERS: Subcentimeter right renal lesion too small to   characterize. No hydronephrosis.    BLADDER: Within normal limits.  REPRODUCTIVE ORGANS: Status post prostatectomy.    BOWEL: No bowel obstruction. Diverticulosis without diverticulitis.   Appendix is not visualized.  PERITONEUM: No ascites.  VESSELS:  Atherosclerotic changes of the aorta and its branches.  RETROPERITONEUM: No lymphadenopathy.    ABDOMINAL WALL: Prior left inguinal hernia repair.  BONES:Degenerative changes of the spine. Nonspecific sclerotic focus in   the partially imaged cervical vertebral body likely related to   degenerative changes.    IMPRESSION:     CHEST:    Subsegmental atelectasis. No focal mass.    ABDOMEN PELVIS:    No lymphadenopathy in the abdomen or pelvis.              WALLY GARCIA M.D., RADIOLOGY RESIDENT  This document has been electronically signed.  EMETERIO VALLEJO M.D., ATTENDING RADIOLOGIST  This document has been electronically signed. Jun 30 2019  3:06PM                < end of copied text > MAURICE BEHAN  [id number]    INTERVAL HPI/OVERNIGHT EVENTS:  Pt says that cough is about the same. Pt says L knee pain resolved. R knee pain still present, but feels less swollen. R ankle unchanged.   + pain in abd when he coughs or hiccups  + non productive cough for at least 3-4 days but he isn't sure if it started before or after the hospitalization     MEDICATIONS  (STANDING):  amiodarone    Tablet 200 milliGRAM(s) Oral two times a day  amiodarone Infusion 1 mG/Min (33.333 mL/Hr) IV Continuous <Continuous>  amiodarone Infusion 0.5 mG/Min (16.667 mL/Hr) IV Continuous <Continuous>  aspirin enteric coated 81 milliGRAM(s) Oral daily  atorvastatin 40 milliGRAM(s) Oral at bedtime  docusate sodium 100 milliGRAM(s) Oral three times a day  enoxaparin Injectable 40 milliGRAM(s) SubCutaneous daily  ethyl chloride Spray 1 Application(s) Topical once  lactated ringers. 1000 milliLiter(s) (75 mL/Hr) IV Continuous <Continuous>  lidocaine   Patch 2 Patch Transdermal daily  lidocaine 1% Injectable 2 milliLiter(s) Local Injection once  lidocaine 2% Injectable 1 milliLiter(s) Local Injection once  naproxen 500 milliGRAM(s) Oral two times a day  pantoprazole    Tablet 40 milliGRAM(s) Oral two times a day  senna 2 Tablet(s) Oral at bedtime  sucralfate 1 Gram(s) Oral two times a day    MEDICATIONS  (PRN):  acetaminophen   Tablet .. 650 milliGRAM(s) Oral every 6 hours PRN Mild Pain (1 - 3)  acetaminophen   Tablet .. 650 milliGRAM(s) Oral every 6 hours PRN Temp greater or equal to 38C (100.4F)  polyethylene glycol 3350 17 Gram(s) Oral daily PRN Constipation  traMADol 100 milliGRAM(s) Oral every 6 hours PRN moderate to severe pain      Allergies    No Known Allergies    Intolerances        Review of Systems:   per HPI otherwise negative.       Vital Signs Last 24 Hrs  T(C): 36.5 (01 Jul 2019 15:30), Max: 36.5 (30 Jun 2019 21:18)  T(F): 97.7 (01 Jul 2019 15:30), Max: 97.7 (30 Jun 2019 21:18)  HR: 81 (01 Jul 2019 15:30) (77 - 110)  BP: 105/42 (01 Jul 2019 15:30) (103/54 - 118/62)  RR: 18 (01 Jul 2019 15:30) (16 - 18)  SpO2: 95% (01 Jul 2019 15:30) (95% - 96%)    Physical Exam:  General: NAD  Cardio: +S1/S2, RRR  Resp: R lobar rales  GI: +BS, soft, NT/ND  MSK: R knee effusion, w restricted ROM 2/2 to pain. R ankle tenosynovitis. L knee pain FROM, no pain.   Neuro: AAOx3      LABS:                        11.5   4.79  )-----------( 205      ( 01 Jul 2019 06:35 )             34.9     07-01    134<L>  |  97<L>  |  24<H>  ----------------------------<  81  4.2   |  22  |  0.86    Ca    9.4      01 Jul 2019 06:35  Mg     2.2     07-01        Sedimentation Rate, Erythrocyte: 44 mm/hr (06.27.19 @ 19:56)    Procalcitonin, Serum  1.73  (06.29.19 @ 05:45)          RADIOLOGY & ADDITIONAL TESTS:    EXAM:  CT ANGIO CHEST (W)AW IC    PROCEDURE DATE:  Jun 30 2019   INTERPRETATION:  CLINICAL INFORMATION: Right lower extremity swelling and   pain with positive d-dimer. Evaluate for pulmonary embolism.   COMPARISON: CT chest abdomen pelvis 6/29/2019.  PROCEDURE:   CT Angiography of the Chest.  90 ml of Omnipaque 350 was injected intravenously. 10 ml were discarded.  Sagittal and coronal reformats were performed as well as 3D (MIP)   reconstructions.  FINDINGS:  LUNGS AND AIRWAYS: Upper lobe paraseptal emphysema/subpleural blebs.   Scattered areas of subsegmental atelectasis. Patent central airways.    PLEURA: Small bilateral pleural effusions.  MEDIASTINUM AND KIZZY: No lymphadenopathy.  PULMONARY ARTERY: No main, right and left main, lobar, segmental or   subsegmental pulmonary emboli.  VESSELS: Atherosclerotic calcification the aorta.  HEART: Heart size is normal. No pericardial effusion. Coronary artery   calcification. Aortic valve calcification.  CHEST WALL AND LOWER NECK: Within normal limits.  VISUALIZED UPPER ABDOMEN: Hepatic hypodensities some of which are too small to characterize.  BONES: Degenerative changes of the spine.  IMPRESSION:   No pulmonary embolus.  WALLY GARCIA M.D., RADIOLOGY RESIDENT  This document has been electronically signed.  BETO DONG M.D., ATTENDING RADIOLOGIST  This document has been electronically signed. Jun 30 2019  1:55PM    < end of copied text >  CT chest reviewed by me on PACS today and ? right sided effusion > left sided effusion???     < from: CT Abdomen and Pelvis w/ IV Cont (06.29.19 @ 19:11) >  EXAM:  CT CHEST IC    EXAM:  CT ABDOMEN AND PELVIS IC    PROCEDURE DATE:  Jun 29 2019   INTERPRETATION:  CLINICAL INFORMATION: Prior history of polymyalgia   rheumatica and prostate cancer. Right lower extremity pain. Evaluate for   mass or pneumonia. Malignancy workup.    COMPARISON: None.    PROCEDURE:   CT of the Chest, Abdomen and Pelvis was performed with intravenous   contrast.   Intravenous contrast: 90 ml Omnipaque 350. 10 ml discarded.  Oral contrast: None.  Sagittal and coronal reformats were performed.  FINDINGS:  CHEST:   LUNGS AND LARGE AIRWAYS: Paraseptal emphysema in the upper lobes.   Scattered areas of subsegmental atelectasis. Patent central airways.   PLEURA: Small right pleural effusion. No pneumothorax.  VESSELS: Atherosclerotic changes of the aorta.   HEART: Heart size is normal. Trace pericardial effusion. Coronary artery   calcification. Aortic valve calcification.  MEDIASTINUM AND KIZZY: No lymphadenopathy.  CHEST WALL AND LOWER NECK: Bilateral gynecomastia.  ABDOMEN AND PELVIS:  LIVER: Scattered hypodensities some of which are too small to   characterize.  BILE DUCTS: Normal caliber.  GALLBLADDER: Within normal limits.  SPLEEN: Within normal limits.  PANCREAS: Within normal limits.  ADRENALS: Within normal limits.  KIDNEYS/URETERS: Subcentimeter right renal lesion too small to   characterize. No hydronephrosis.  BLADDER: Within normal limits.  REPRODUCTIVE ORGANS: Status post prostatectomy.  BOWEL: No bowel obstruction. Diverticulosis without diverticulitis.   Appendix is not visualized.  PERITONEUM: No ascites.  VESSELS:  Atherosclerotic changes of the aorta and its branches.  RETROPERITONEUM: No lymphadenopathy.    ABDOMINAL WALL: Prior left inguinal hernia repair.  BONES:Degenerative changes of the spine. Nonspecific sclerotic focus in   the partially imaged cervical vertebral body likely related to   degenerative changes.  IMPRESSION:   CHEST:  Subsegmental atelectasis. No focal mass.  ABDOMEN PELVIS:  No lymphadenopathy in the abdomen or pelvis.              WALLY GARCIA M.D., RADIOLOGY RESIDENT  This document has been electronically signed.  EMETERIO VALLEJO M.D., ATTENDING RADIOLOGIST  This document has been electronically signed. Jun 30 2019  3:06PM                < end of copied text >

## 2019-07-01 NOTE — PROGRESS NOTE ADULT - SUBJECTIVE AND OBJECTIVE BOX
Subjective: Patient seen and examined. No new events except as noted.     SUBJECTIVE/ROS:  feels a bit better  has chest pain , worse with laying flat and inspiration       MEDICATIONS:  MEDICATIONS  (STANDING):  amiodarone    Tablet 200 milliGRAM(s) Oral daily  amiodarone Infusion 1 mG/Min (33.333 mL/Hr) IV Continuous <Continuous>  amiodarone Infusion 0.5 mG/Min (16.667 mL/Hr) IV Continuous <Continuous>  aspirin enteric coated 81 milliGRAM(s) Oral daily  atorvastatin 40 milliGRAM(s) Oral at bedtime  cefTRIAXone   IVPB 2000 milliGRAM(s) IV Intermittent every 24 hours  colchicine 0.6 milliGRAM(s) Oral once  docusate sodium 100 milliGRAM(s) Oral three times a day  enoxaparin Injectable 40 milliGRAM(s) SubCutaneous daily  ethyl chloride Spray 1 Application(s) Topical once  lactated ringers. 1000 milliLiter(s) (75 mL/Hr) IV Continuous <Continuous>  lidocaine   Patch 2 Patch Transdermal daily  lidocaine 1% Injectable 2 milliLiter(s) Local Injection once  lidocaine 2% Injectable 1 milliLiter(s) Local Injection once  naproxen 500 milliGRAM(s) Oral two times a day  pantoprazole    Tablet 40 milliGRAM(s) Oral two times a day  senna 2 Tablet(s) Oral at bedtime  sucralfate 1 Gram(s) Oral two times a day      PHYSICAL EXAM:  T(C): 36.4 (07-01-19 @ 06:12), Max: 36.7 (06-30-19 @ 13:14)  HR: 80 (07-01-19 @ 06:12) (77 - 130)  BP: 103/54 (07-01-19 @ 06:12) (103/54 - 128/65)  RR: 17 (07-01-19 @ 06:12) (16 - 17)  SpO2: 95% (07-01-19 @ 06:12) (95% - 98%)  Wt(kg): --  I&O's Summary          JVP: Normal  Neck: supple  Lung: few crackles   CV: S1 S2 , Murmur:  Abd: soft  Ext: No edema  neuro: Awake / alert  Psych: flat affect  Skin: normal``    LABS/DATA:    CARDIAC MARKERS:  CARDIAC MARKERS ( 29 Jun 2019 12:35 )  x     / x     / 120 u/L / x     / x      CARDIAC MARKERS ( 29 Jun 2019 05:45 )  x     / x     / 167 u/L / x     / x                                    11.5   4.79  )-----------( 205      ( 01 Jul 2019 06:35 )             34.9     07-01    134<L>  |  97<L>  |  24<H>  ----------------------------<  81  4.2   |  22  |  0.86    Ca    9.4      01 Jul 2019 06:35  Mg     2.2     07-01      proBNP:   Lipid Profile:   HgA1c:   TSH:     TELE:  EKG:

## 2019-07-01 NOTE — PROGRESS NOTE ADULT - SUBJECTIVE AND OBJECTIVE BOX
INTERVAL HPI/OVERNIGHT EVENTS:    feeling well   no melena  no hematochezia  denies n/v/d/c, abdominal pain, melena or brbpr     MEDICATIONS  (STANDING):  amiodarone    Tablet 200 milliGRAM(s) Oral daily  amiodarone Infusion 1 mG/Min (33.333 mL/Hr) IV Continuous <Continuous>  amiodarone Infusion 0.5 mG/Min (16.667 mL/Hr) IV Continuous <Continuous>  aspirin enteric coated 81 milliGRAM(s) Oral daily  atorvastatin 40 milliGRAM(s) Oral at bedtime  cefTRIAXone   IVPB 2000 milliGRAM(s) IV Intermittent every 24 hours  docusate sodium 100 milliGRAM(s) Oral three times a day  enoxaparin Injectable 40 milliGRAM(s) SubCutaneous daily  ethyl chloride Spray 1 Application(s) Topical once  lactated ringers. 1000 milliLiter(s) (75 mL/Hr) IV Continuous <Continuous>  lidocaine   Patch 2 Patch Transdermal daily  lidocaine 1% Injectable 2 milliLiter(s) Local Injection once  lidocaine 2% Injectable 1 milliLiter(s) Local Injection once  naproxen 500 milliGRAM(s) Oral two times a day  pantoprazole    Tablet 40 milliGRAM(s) Oral two times a day  senna 2 Tablet(s) Oral at bedtime  sucralfate 1 Gram(s) Oral two times a day    MEDICATIONS  (PRN):  acetaminophen   Tablet .. 650 milliGRAM(s) Oral every 6 hours PRN Mild Pain (1 - 3)  acetaminophen   Tablet .. 650 milliGRAM(s) Oral every 6 hours PRN Temp greater or equal to 38C (100.4F)  polyethylene glycol 3350 17 Gram(s) Oral daily PRN Constipation  traMADol 100 milliGRAM(s) Oral every 6 hours PRN moderate to severe pain      Allergies    No Known Allergies    Intolerances        Review of Systems:    General:  No wt loss, fevers, chills, night sweats, fatigue   Eyes:  Good vision, no reported pain  ENT:  No sore throat, pain, runny nose, dysphagia  CV:  No pain, palpitations, hypo/hypertension  Resp:  No dyspnea, cough, tachypnea, wheezing  GI:  No pain, No nausea, No vomiting, No diarrhea, No constipation, No weight loss, No fever, No pruritis, No rectal bleeding, No melena, No dysphagia  :  No pain, bleeding, incontinence, nocturia  Muscle:  No pain, weakness  Neuro:  No weakness, tingling, memory problems  Psych:  No fatigue, insomnia, mood problems, depression  Endocrine:  No polyuria, polydypsia, cold/heat intolerance  Heme:  No petechiae, ecchymosis, easy bruisability  Skin:  No rash, tattoos, scars, edema      Vital Signs Last 24 Hrs  T(C): 36.4 (2019 06:12), Max: 36.7 (2019 13:14)  T(F): 97.6 (2019 06:12), Max: 98 (2019 13:14)  HR: 80 (2019 06:12) (77 - 130)  BP: 103/54 (2019 06:12) (103/54 - 128/65)  BP(mean): --  RR: 17 (2019 06:12) (16 - 17)  SpO2: 95% (2019 06:12) (95% - 98%)    PHYSICAL EXAM:    Constitutional: NAD  HEENT: EOMI, throat clear  Neck: No LAD, supple  Respiratory: CTA and P  Cardiovascular: S1 and S2, RRR, no M  Gastrointestinal: BS+, soft, NT/ND, neg HSM,  Extremities: No peripheral edema, neg clubbing, cyanosis  Vascular: 2+ peripheral pulses  Neurological: A/O x 3, no focal deficits  Psychiatric: Normal mood, normal affect  Skin: No rashes      LABS:                        11.5   4.79  )-----------( 205      ( 2019 06:35 )             34.9     07-01    134<L>  |  97<L>  |  24<H>  ----------------------------<  81  4.2   |  22  |  0.86    Ca    9.4      2019 06:35  Mg     2.2     07-01        Urinalysis Basic - ( 2019 12:40 )    Color: YELLOW / Appearance: CLEAR / S.032 / pH: 6.0  Gluc: NEGATIVE / Ketone: TRACE  / Bili: NEGATIVE / Urobili: NORMAL   Blood: NEGATIVE / Protein: 200 / Nitrite: NEGATIVE   Leuk Esterase: NEGATIVE / RBC: 0-2 / WBC 0-2   Sq Epi: OCC / Non Sq Epi: x / Bacteria: NEGATIVE        RADIOLOGY & ADDITIONAL TESTS:

## 2019-07-01 NOTE — PROGRESS NOTE ADULT - PROBLEM SELECTOR PLAN 1
likely stress response to underlying inflammation vs. ?infection in body  seen by cards  amiodarone load initiated  tele for now, converted

## 2019-07-01 NOTE — PROGRESS NOTE ADULT - ASSESSMENT
72 y/o M hx of remote supposed PMR, presenting w acute oligoarthritis  s/p R knee, right ankle arthrocentesis w inflammatory fluid Rt knee> right ankle but no crystals, confirmed w pathologist  No growth on synovical fluid >48 hours   Awaiting MRI of R knee; US showing possible cyst/hematoma; collection?     Now febrile (fevers resolved), but still w persistent cough, RLL crackles  CT reported as upper lobe blebs/ emphysema, but on self review, loculated effusion on RLL?   Pt developed pain on inspiration, which can be seen in pericarditis but no pericardial effusion on imaging/ TTE    ddx: RA vs SLE vs seronegative inflammatory arthritis? vs viral hepaitits  RA can be considered, but atypical presentation given asymmetric arthritis that happened acutely.   Lupus considered now since pt has arthritis and possible pericarditis  Viral hepatitis less likely given no transaminitis   Still concern for infection and should be r/o    Recommend  Please obtain Hep B, C serologies, QUENTIN, DsDNA, C3, C4, smith, C3, C4, urine protein/cr ratio  f/u CPP  Will review CT chest and MRI R knee w radiologist tmnw if no radiographic evidence of infection is seen will consider steroids  Madan Thornton MD PGY4  [id number] 74 y/o M hx of remote supposed PMR, presenting w acute oligoarthritis  s/p R knee, right ankle arthrocentesis w inflammatory fluid Rt knee> right ankle but no crystals, confirmed w pathologist  No growth on synovical fluid >48 hours   Awaiting MRI of R knee; US showing possible cyst/hematoma; collection?   Now febrile (fevers resolved), but still w persistent cough, RLL crackles  CT reported as upper lobe blebs/ emphysema, but on self review, loculated effusion on RLL?   Pt developed pain on inspiration, which can be seen in pericarditis but no pericardial effusion on imaging/ TTE  ddx: RA vs SLE vs seronegative inflammatory arthritis? vs viral hepaitits  RA can be considered, but atypical presentation given asymmetric arthritis that happened acutely.   Lupus considered now since pt has arthritis and possible pericarditis  Viral hepatitis less likely given no transaminitis   Still concern for infection and should be r/o    Recommend:   Please obtain Hep B, C serologies, QUENTIN, DsDNA, C3, C4, smith, C3, C4, urine protein/cr ratio  f/u CPP  Will review CT chest and MRI R knee w radiologist tomorrow if no radiographic evidence of infection is seen will consider empiric  steroids while complete workup.       Madan Thornton MD PGY4  [phone]

## 2019-07-01 NOTE — PROGRESS NOTE ADULT - PROBLEM SELECTOR PLAN 1
- trend h/h; appears to be at baseline  - transfuse prn   - cont protonix/carafate  - maalox prn   - avoid nsaids  - iron studies reviewed, consider heme eval  - given hgb at baseline and no overt gi bleeding, agree with primary team to defer endoscopic evaluation at this time

## 2019-07-02 LAB
ANION GAP SERPL CALC-SCNC: 16 MMO/L — HIGH (ref 7–14)
B19V DNA FLD QL NAA+PROBE: SIGNIFICANT CHANGE UP IU/ML
BACTERIA BLD CULT: SIGNIFICANT CHANGE UP
BACTERIA BLD CULT: SIGNIFICANT CHANGE UP
BUN SERPL-MCNC: 22 MG/DL — SIGNIFICANT CHANGE UP (ref 7–23)
C3 SERPL-MCNC: 145.1 MG/DL — SIGNIFICANT CHANGE UP (ref 90–180)
C4 SERPL-MCNC: 21.5 MG/DL — SIGNIFICANT CHANGE UP (ref 10–40)
CALCIUM SERPL-MCNC: 9.4 MG/DL — SIGNIFICANT CHANGE UP (ref 8.4–10.5)
CCP AB SER-ACNC: SIGNIFICANT CHANGE UP
CHLORIDE SERPL-SCNC: 97 MMOL/L — LOW (ref 98–107)
CO2 SERPL-SCNC: 24 MMOL/L — SIGNIFICANT CHANGE UP (ref 22–31)
CREAT SERPL-MCNC: 0.87 MG/DL — SIGNIFICANT CHANGE UP (ref 0.5–1.3)
GLUCOSE SERPL-MCNC: 90 MG/DL — SIGNIFICANT CHANGE UP (ref 70–99)
HAV IGM SER-ACNC: NONREACTIVE — SIGNIFICANT CHANGE UP
HBV CORE AB SER-ACNC: NONREACTIVE — SIGNIFICANT CHANGE UP
HBV CORE IGM SER-ACNC: NONREACTIVE — SIGNIFICANT CHANGE UP
HBV SURFACE AG SER-ACNC: NONREACTIVE — SIGNIFICANT CHANGE UP
HCT VFR BLD CALC: 33.4 % — LOW (ref 39–50)
HCV AB S/CO SERPL IA: 0.17 S/CO — SIGNIFICANT CHANGE UP (ref 0–0.99)
HCV AB SERPL-IMP: SIGNIFICANT CHANGE UP
HGB BLD-MCNC: 11.3 G/DL — LOW (ref 13–17)
MAGNESIUM SERPL-MCNC: 2.3 MG/DL — SIGNIFICANT CHANGE UP (ref 1.6–2.6)
MCHC RBC-ENTMCNC: 32.3 PG — SIGNIFICANT CHANGE UP (ref 27–34)
MCHC RBC-ENTMCNC: 33.8 % — SIGNIFICANT CHANGE UP (ref 32–36)
MCV RBC AUTO: 95.4 FL — SIGNIFICANT CHANGE UP (ref 80–100)
NRBC # FLD: 0 K/UL — SIGNIFICANT CHANGE UP (ref 0–0)
PLATELET # BLD AUTO: 213 K/UL — SIGNIFICANT CHANGE UP (ref 150–400)
PMV BLD: 9 FL — SIGNIFICANT CHANGE UP (ref 7–13)
POTASSIUM SERPL-MCNC: 3.9 MMOL/L — SIGNIFICANT CHANGE UP (ref 3.5–5.3)
POTASSIUM SERPL-SCNC: 3.9 MMOL/L — SIGNIFICANT CHANGE UP (ref 3.5–5.3)
RBC # BLD: 3.5 M/UL — LOW (ref 4.2–5.8)
RBC # FLD: 15.1 % — HIGH (ref 10.3–14.5)
SODIUM SERPL-SCNC: 137 MMOL/L — SIGNIFICANT CHANGE UP (ref 135–145)
WBC # BLD: 4.04 K/UL — SIGNIFICANT CHANGE UP (ref 3.8–10.5)
WBC # FLD AUTO: 4.04 K/UL — SIGNIFICANT CHANGE UP (ref 3.8–10.5)

## 2019-07-02 PROCEDURE — 99233 SBSQ HOSP IP/OBS HIGH 50: CPT

## 2019-07-02 PROCEDURE — 99232 SBSQ HOSP IP/OBS MODERATE 35: CPT

## 2019-07-02 PROCEDURE — 99232 SBSQ HOSP IP/OBS MODERATE 35: CPT | Mod: GC

## 2019-07-02 RX ORDER — ONDANSETRON 8 MG/1
4 TABLET, FILM COATED ORAL ONCE
Refills: 0 | Status: COMPLETED | OUTPATIENT
Start: 2019-07-02 | End: 2019-07-02

## 2019-07-02 RX ORDER — COLCHICINE 0.6 MG
0.6 TABLET ORAL DAILY
Refills: 0 | Status: DISCONTINUED | OUTPATIENT
Start: 2019-07-02 | End: 2019-07-03

## 2019-07-02 RX ADMIN — SODIUM CHLORIDE 75 MILLILITER(S): 9 INJECTION, SOLUTION INTRAVENOUS at 08:36

## 2019-07-02 RX ADMIN — ENOXAPARIN SODIUM 40 MILLIGRAM(S): 100 INJECTION SUBCUTANEOUS at 13:01

## 2019-07-02 RX ADMIN — ATORVASTATIN CALCIUM 40 MILLIGRAM(S): 80 TABLET, FILM COATED ORAL at 21:34

## 2019-07-02 RX ADMIN — Medication 500 MILLIGRAM(S): at 23:33

## 2019-07-02 RX ADMIN — Medication 81 MILLIGRAM(S): at 13:01

## 2019-07-02 RX ADMIN — Medication 0.6 MILLIGRAM(S): at 13:01

## 2019-07-02 RX ADMIN — Medication 500 MILLIGRAM(S): at 00:00

## 2019-07-02 RX ADMIN — SENNA PLUS 2 TABLET(S): 8.6 TABLET ORAL at 21:34

## 2019-07-02 RX ADMIN — AMIODARONE HYDROCHLORIDE 200 MILLIGRAM(S): 400 TABLET ORAL at 08:38

## 2019-07-02 RX ADMIN — Medication 1 GRAM(S): at 08:40

## 2019-07-02 RX ADMIN — ONDANSETRON 4 MILLIGRAM(S): 8 TABLET, FILM COATED ORAL at 16:50

## 2019-07-02 RX ADMIN — Medication 20 MILLIGRAM(S): at 23:33

## 2019-07-02 RX ADMIN — LIDOCAINE 2 PATCH: 4 CREAM TOPICAL at 19:36

## 2019-07-02 RX ADMIN — LIDOCAINE 2 PATCH: 4 CREAM TOPICAL at 13:01

## 2019-07-02 RX ADMIN — Medication 500 MILLIGRAM(S): at 14:00

## 2019-07-02 RX ADMIN — SODIUM CHLORIDE 75 MILLILITER(S): 9 INJECTION, SOLUTION INTRAVENOUS at 13:00

## 2019-07-02 RX ADMIN — Medication 100 MILLIGRAM(S): at 21:34

## 2019-07-02 RX ADMIN — PANTOPRAZOLE SODIUM 40 MILLIGRAM(S): 20 TABLET, DELAYED RELEASE ORAL at 08:39

## 2019-07-02 RX ADMIN — AMIODARONE HYDROCHLORIDE 200 MILLIGRAM(S): 400 TABLET ORAL at 21:33

## 2019-07-02 RX ADMIN — SODIUM CHLORIDE 75 MILLILITER(S): 9 INJECTION, SOLUTION INTRAVENOUS at 21:35

## 2019-07-02 RX ADMIN — ONDANSETRON 4 MILLIGRAM(S): 8 TABLET, FILM COATED ORAL at 06:39

## 2019-07-02 RX ADMIN — Medication 500 MILLIGRAM(S): at 13:01

## 2019-07-02 NOTE — PROGRESS NOTE ADULT - ASSESSMENT
acute pericarditis   improving  cont colchicine     afib  converted to sinus  recurrent   will hold off to a/c as he may convert to hemorrhagic pleural / pericardial effusion   cont amio , 200 BID

## 2019-07-02 NOTE — PROGRESS NOTE ADULT - SUBJECTIVE AND OBJECTIVE BOX
Patient is a 73y old  Male who presents with a chief complaint of RLE pain         SUBJECTIVE / OVERNIGHT EVENTS: improved from yesterday but had some nausea overnight; still not eating; advised to eat with meds to help with nausea      MEDICATIONS  (STANDING):  amiodarone    Tablet 200 milliGRAM(s) Oral two times a day  amiodarone Infusion 1 mG/Min (33.333 mL/Hr) IV Continuous <Continuous>  amiodarone Infusion 0.5 mG/Min (16.667 mL/Hr) IV Continuous <Continuous>  aspirin enteric coated 81 milliGRAM(s) Oral daily  atorvastatin 40 milliGRAM(s) Oral at bedtime  colchicine 0.6 milliGRAM(s) Oral daily  docusate sodium 100 milliGRAM(s) Oral three times a day  enoxaparin Injectable 40 milliGRAM(s) SubCutaneous daily  ethyl chloride Spray 1 Application(s) Topical once  lactated ringers. 1000 milliLiter(s) (75 mL/Hr) IV Continuous <Continuous>  lidocaine   Patch 2 Patch Transdermal daily  lidocaine 1% Injectable 2 milliLiter(s) Local Injection once  lidocaine 2% Injectable 1 milliLiter(s) Local Injection once  naproxen 500 milliGRAM(s) Oral two times a day  pantoprazole    Tablet 40 milliGRAM(s) Oral two times a day  senna 2 Tablet(s) Oral at bedtime  sucralfate 1 Gram(s) Oral two times a day    MEDICATIONS  (PRN):  acetaminophen   Tablet .. 650 milliGRAM(s) Oral every 6 hours PRN Mild Pain (1 - 3)  acetaminophen   Tablet .. 650 milliGRAM(s) Oral every 6 hours PRN Temp greater or equal to 38C (100.4F)  polyethylene glycol 3350 17 Gram(s) Oral daily PRN Constipation  traMADol 100 milliGRAM(s) Oral every 6 hours PRN moderate to severe pain      Vital Signs Last 24 Hrs  T(F): 98 (02 Jul 2019 13:04), Max: 98.1 (01 Jul 2019 20:39)  HR: 80 (02 Jul 2019 13:04) (80 - 82)  BP: 129/56 (02 Jul 2019 13:04) (105/42 - 143/66)  RR: 19 (02 Jul 2019 13:04) (18 - 20)  SpO2: 94% (02 Jul 2019 13:04) (94% - 97%)          PHYSICAL EXAM  GENERAL: NAD, well-developed  EYES: EOMI, PERRLA, conjunctiva and sclera clear  CHEST/LUNG: Clear to auscultation bilaterally; better inhale  HEART: Regular rate and rhythm;   ABDOMEN: Soft, Nontender today, Nondistended; Bowel sounds present  EXTREMITIES:  R>LLE      LABS:                        11.3   4.04  )-----------( 213      ( 02 Jul 2019 06:37 )             33.4     07-02    137  |  97<L>  |  22  ----------------------------<  90  3.9   |  24  |  0.87    Ca    9.4      02 Jul 2019 06:37  Mg     2.3     07-02 Patient is a 73y old  Male who presents with a chief complaint of RLE pain         SUBJECTIVE / OVERNIGHT EVENTS: improved from yesterday but had some nausea overnight; still not eating; advised to eat with meds to help with nausea      MEDICATIONS  (STANDING):  amiodarone    Tablet 200 milliGRAM(s) Oral two times a day  amiodarone Infusion 1 mG/Min (33.333 mL/Hr) IV Continuous <Continuous>  amiodarone Infusion 0.5 mG/Min (16.667 mL/Hr) IV Continuous <Continuous>  aspirin enteric coated 81 milliGRAM(s) Oral daily  atorvastatin 40 milliGRAM(s) Oral at bedtime  colchicine 0.6 milliGRAM(s) Oral daily  docusate sodium 100 milliGRAM(s) Oral three times a day  enoxaparin Injectable 40 milliGRAM(s) SubCutaneous daily  ethyl chloride Spray 1 Application(s) Topical once  lactated ringers. 1000 milliLiter(s) (75 mL/Hr) IV Continuous <Continuous>  lidocaine   Patch 2 Patch Transdermal daily  lidocaine 1% Injectable 2 milliLiter(s) Local Injection once  lidocaine 2% Injectable 1 milliLiter(s) Local Injection once  naproxen 500 milliGRAM(s) Oral two times a day  pantoprazole    Tablet 40 milliGRAM(s) Oral two times a day  senna 2 Tablet(s) Oral at bedtime  sucralfate 1 Gram(s) Oral two times a day    MEDICATIONS  (PRN):  acetaminophen   Tablet .. 650 milliGRAM(s) Oral every 6 hours PRN Mild Pain (1 - 3)  acetaminophen   Tablet .. 650 milliGRAM(s) Oral every 6 hours PRN Temp greater or equal to 38C (100.4F)  polyethylene glycol 3350 17 Gram(s) Oral daily PRN Constipation  traMADol 100 milliGRAM(s) Oral every 6 hours PRN moderate to severe pain      Vital Signs Last 24 Hrs  T(F): 98 (02 Jul 2019 13:04), Max: 98.1 (01 Jul 2019 20:39)  HR: 80 (02 Jul 2019 13:04) (80 - 82)  BP: 129/56 (02 Jul 2019 13:04) (105/42 - 143/66)  RR: 19 (02 Jul 2019 13:04) (18 - 20)  SpO2: 94% (02 Jul 2019 13:04) (94% - 97%)          PHYSICAL EXAM  GENERAL: NAD, well-developed  EYES: EOMI, PERRLA, conjunctiva and sclera clear  CHEST/LUNG: Clear to auscultation bilaterally; better inhale  HEART: Regular rate and rhythm;   ABDOMEN: Soft, Nontender today, Nondistended; Bowel sounds present  EXTREMITIES:  R>LLE      LABS:                        11.3   4.04  )-----------( 213      ( 02 Jul 2019 06:37 )             33.4     07-02    137  |  97<L>  |  22  ----------------------------<  90  3.9   |  24  |  0.87    Ca    9.4      02 Jul 2019 06:37  Mg     2.3     07-02              case d/w Dr Ambriz, Dr Ford

## 2019-07-02 NOTE — PROGRESS NOTE ADULT - ASSESSMENT
72 y/o M hx of remote supposed PMR, presenting w acute oligoarthritis, possible pericarditis b/l pleural effusions  s/p R knee, right ankle arthrocentesis w inflammatory fluid Rt knee> right ankle but no crystals, confirmed w pathologist; No growth on synovical fluid >48 hours   Reviewed MRI of R knee w radiologist showing tenosynovitis, b/l meniscus tear; R ankle xray showing osteochondral cystic changes, consistent w inflammatory arthritis  Reviewed CT chest images w radiologist, and pt has b/l pleural effusions besides upper lobe emphysema/blebs; no evidence of PNA; per ID, no evidence of infection in pt    ddx: seronegative RA/inflammatory vs SLE   RA can be considered, but atypical presentation given asymmetric arthritis that happened acutely. Serologically negative.   Lupus considered now since pt has arthritis and possible pericarditis, b/l pleural effusions      Recommend:   f/u QUENTIN, DsDNA; send SSA, SSB, RNP, smith   Please given pt solumedrol 20mg IV today; will reassess tmwr      Madan Thornton MD PGY4  [phone]

## 2019-07-02 NOTE — PROGRESS NOTE ADULT - SUBJECTIVE AND OBJECTIVE BOX
MAURICE BEHAN  [id number]    INTERVAL HPI/OVERNIGHT EVENTS:  Pt still w persistent pain, swelling of R knee, R ankle. No fevers, chills. Cough.     MEDICATIONS  (STANDING):  amiodarone    Tablet 200 milliGRAM(s) Oral two times a day  amiodarone Infusion 1 mG/Min (33.333 mL/Hr) IV Continuous <Continuous>  amiodarone Infusion 0.5 mG/Min (16.667 mL/Hr) IV Continuous <Continuous>  aspirin enteric coated 81 milliGRAM(s) Oral daily  atorvastatin 40 milliGRAM(s) Oral at bedtime  colchicine 0.6 milliGRAM(s) Oral daily  docusate sodium 100 milliGRAM(s) Oral three times a day  enoxaparin Injectable 40 milliGRAM(s) SubCutaneous daily  ethyl chloride Spray 1 Application(s) Topical once  lactated ringers. 1000 milliLiter(s) (75 mL/Hr) IV Continuous <Continuous>  lidocaine   Patch 2 Patch Transdermal daily  lidocaine 1% Injectable 2 milliLiter(s) Local Injection once  lidocaine 2% Injectable 1 milliLiter(s) Local Injection once  naproxen 500 milliGRAM(s) Oral two times a day  pantoprazole    Tablet 40 milliGRAM(s) Oral two times a day  senna 2 Tablet(s) Oral at bedtime  sucralfate 1 Gram(s) Oral two times a day    MEDICATIONS  (PRN):  acetaminophen   Tablet .. 650 milliGRAM(s) Oral every 6 hours PRN Mild Pain (1 - 3)  acetaminophen   Tablet .. 650 milliGRAM(s) Oral every 6 hours PRN Temp greater or equal to 38C (100.4F)  polyethylene glycol 3350 17 Gram(s) Oral daily PRN Constipation  traMADol 100 milliGRAM(s) Oral every 6 hours PRN moderate to severe pain      Allergies    No Known Allergies    Intolerances        Review of Systems:   as per HPI otherwise negative     Vital Signs Last 24 Hrs  T(C): 36.7 (02 Jul 2019 13:04), Max: 36.7 (01 Jul 2019 20:39)  T(F): 98 (02 Jul 2019 13:04), Max: 98.1 (01 Jul 2019 20:39)  HR: 80 (02 Jul 2019 13:04) (80 - 82)  BP: 129/56 (02 Jul 2019 13:04) (125/63 - 143/66)  BP(mean): --  RR: 19 (02 Jul 2019 13:04) (18 - 20)  SpO2: 94% (02 Jul 2019 13:04) (94% - 97%)    Physical Exam:  General: NAD  Cardio: +S1/S2, RRR  Resp: R lobar rales  GI: +BS, soft, NT/ND  MSK: R knee effusion, w restricted ROM 2/2 to pain. R ankle tenosynovitis. L knee pain FROM, no pain.   Neuro: AAOx3    LABS:                        11.3   4.04  )-----------( 213      ( 02 Jul 2019 06:37 )             33.4     07-02    137  |  97<L>  |  22  ----------------------------<  90  3.9   |  24  |  0.87    Ca    9.4      02 Jul 2019 06:37  Mg     2.3     07-02      Rheumatoid Factor Quant, Serum or Plasma in AM (06.28.19 @ 05:22)    Rheumatoid Factor Quant, Serum or Plasma: 23.0: RESULTS <14.0 IU/mL ARE NEGATIVE FOR RHEUMATOID FACTOR. IU/mL    Cyclic Citrullinated Peptide AB (06.29.19 @ 05:45)    Cyclic Citrullinated Peptide AB: --: CCP Ab IgG EIA    <8                        0-19 Units  CCP INTERP       NEGATIVE                    NEGATIVE    Method:  EIA    Cyclic Citrullinated Peptide Ab, IGG Reference Range:    Units  <20           Negative  20 - 39       Weak Positive  40 -80        Moderate Positive  >80.0        Strong Positive      Urine protein/cr: 0.56    Hepatitis B Core Antibody, Total (07.02.19 @ 06:37)    Hepatitis B Core Antibody, Total: Nonreactive    Acute Hepatitis Panel (07.02.19 @ 06:37)    Hepatitis C Virus Interpretation: Nonreactive Hepatitis C AB  S/CO Ratio                        Interpretation  < 1.00                                   Non-Reactive  1.00 - 4.99                         Weakly-Reactive  >= 5.00                                Reactive  Non-Reactive: [place] with a non-reactive HCV antibody  result is considered uninfected.  No further action is  needed unless recent infection is suspected.  In these  cases, consider repeat testing later to detect  seroconversion..  Weakly-Reactive: HCV antibody test is abnormal, HCV RNA  Qualitative test will follow.  Reactive: HCV antibody test is abnormal, HCV RNA  Qualitative test will follow.  Note: HCV antibody testing is performed on the [employer]   system.    Hepatitis C Virus S/CO Ratio: 0.17 S/CO    Hepatitis B Core IgM Antibody: Nonreactive    Hepatitis B Surface Antigen: Nonreactive    Hepatitis A IgM Antibody: Nonreactive        RADIOLOGY & ADDITIONAL TESTS:      < from: MR Knee No Cont, Right (07.01.19 @ 16:25) >  EXAM:  MR KNEE RT      EXAM:  MR TIB FIB RT        PROCEDURE DATE:  Jul 1 2019         INTERPRETATION:    MRI OF THE RIGHT KNEE AND TIBIA/FIBULA    CLINICAL INDICATION: Knee pain and swelling. Cyst versus hematoma   visualized in the region of theright posterior tibial vein as documented   on prior ultrasound.  TECHNIQUE: Multiplanar, Multisequence MRI was obtained of the right knee   and tibia/fibula.    FINDINGS:    The cruciate and collateral ligaments are intact.  There is a horizontal tear of the posterior horn of the medial meniscus.   There is focal full-thickness chondral loss along the far medial aspect   of the medial tibial plateau with subchondral edema. There is a tear of   the anterior horn body and posterior horn of the lateral meniscus. There   is diffuse lateral femorotibial chondral thinning with multifocal areas   of full-thickness chondral loss. Focal full-thickness chondral fissure   within the lateral patellar facet. Remaining articular surfaces of the   patellofemoral compartment are maintained. Quadriceps and patellar   tendons are intact. There is a large joint effusion and synovitis. There   is a small Baker's cyst with fluid decompressing along the   anterior/superficial myofascial plane of the medial head of the   gastrocnemius, which likely represents hypoechoic focus visualized on   prior ultrasound. There is nonspecific edema within the calf and shin   musculature. There is diffuse subcutaneous edema about the shin and calf,   most notably along the lateral knee. No osseous signal abnormality.     IMPRESSION:   Large joint effusion and synovitis. Medial and lateral compartment   meniscal tears and mild chondral wear.  Small Baker cyst decompressing along the medial head of the   gastrocnemius, likely representing cystic focus appreciated on prior   ultrasound. Diffuse cutaneous edema with prominent subcutaneous fluid   along the lateral aspect of the knee. No osseous signal abnormality.   Nonspecific muscle edema.  Constellation of findings appears inflammatory etiology given patient's   history of polymyalgia rheumatica.                    DARIO HUDSON M.D., ATTENDING RADIOLOGIST  This document has been electronically signed. Jul 1 2019  4:58PM                  < end of copied text >    < from: CT Angio Chest w/ IV Cont (06.30.19 @ 13:05) >  XAM:  CT ANGIO CHEST (W)AW IC        PROCEDURE DATE:  Jun 30 2019         INTERPRETATION:  CLINICAL INFORMATION: Right lower extremity swelling and   pain with positive d-dimer. Evaluate for pulmonary embolism.     COMPARISON: CT chest abdomen pelvis 6/29/2019.    PROCEDURE:   CT Angiography of the Chest.  90 ml of Omnipaque 350 was injected intravenously. 10 ml were discarded.  Sagittal and coronal reformats were performed as well as 3D (MIP)   reconstructions.      FINDINGS:    LUNGS AND AIRWAYS: Upper lobe paraseptal emphysema/subpleural blebs.   Scattered areas of subsegmental atelectasis. Patent central airways.      PLEURA: Small bilateral pleural effusions.    MEDIASTINUM AND KIZZY: No lymphadenopathy.    PULMONARY ARTERY: No main, right and left main, lobar, segmental or   subsegmental pulmonary emboli.    VESSELS: Atherosclerotic calcification the aorta.    HEART: Heart size is normal. No pericardial effusion. Coronary artery   calcification. Aortic valve calcification.    CHEST WALL AND LOWER NECK: Within normal limits.    VISUALIZED UPPER ABDOMEN: Hepatic hypodensities some of which are too   small to characterize.    BONES: Degenerative changes of the spine.    IMPRESSION:     No pulmonary embolus.              WALLY GARCIA M.D., RADIOLOGY RESIDENT  This document has been electronically signed.  BETO DONG M.D., ATTENDING RADIOLOGIST  This document has been electronically signed. Jun 30 2019  1:55PM                  < end of copied text >

## 2019-07-02 NOTE — PROGRESS NOTE ADULT - SUBJECTIVE AND OBJECTIVE BOX
Subjective: Patient seen and examined. No new events except as noted.     SUBJECTIVE/ROS:  feels better today   less chest pain       MEDICATIONS:  MEDICATIONS  (STANDING):  amiodarone    Tablet 200 milliGRAM(s) Oral two times a day  amiodarone Infusion 1 mG/Min (33.333 mL/Hr) IV Continuous <Continuous>  amiodarone Infusion 0.5 mG/Min (16.667 mL/Hr) IV Continuous <Continuous>  aspirin enteric coated 81 milliGRAM(s) Oral daily  atorvastatin 40 milliGRAM(s) Oral at bedtime  colchicine 0.6 milliGRAM(s) Oral daily  docusate sodium 100 milliGRAM(s) Oral three times a day  enoxaparin Injectable 40 milliGRAM(s) SubCutaneous daily  ethyl chloride Spray 1 Application(s) Topical once  lactated ringers. 1000 milliLiter(s) (75 mL/Hr) IV Continuous <Continuous>  lidocaine   Patch 2 Patch Transdermal daily  lidocaine 1% Injectable 2 milliLiter(s) Local Injection once  lidocaine 2% Injectable 1 milliLiter(s) Local Injection once  naproxen 500 milliGRAM(s) Oral two times a day  pantoprazole    Tablet 40 milliGRAM(s) Oral two times a day  senna 2 Tablet(s) Oral at bedtime  sucralfate 1 Gram(s) Oral two times a day      PHYSICAL EXAM:  T(C): 36.7 (07-02-19 @ 13:04), Max: 36.7 (07-01-19 @ 20:39)  HR: 80 (07-02-19 @ 13:04) (80 - 82)  BP: 129/56 (07-02-19 @ 13:04) (125/63 - 143/66)  RR: 19 (07-02-19 @ 13:04) (18 - 20)  SpO2: 94% (07-02-19 @ 13:04) (94% - 97%)  Wt(kg): --  I&O's Summary    01 Jul 2019 07:01  -  02 Jul 2019 07:00  --------------------------------------------------------  IN: 1175 mL / OUT: 1025 mL / NET: 150 mL            JVP: Normal  Neck: supple  Lung: clear   CV: S1 S2 , Murmur:  Abd: soft  Ext: No edema  neuro: Awake / alert  Psych: flat affect  Skin: normal``    LABS/DATA:    CARDIAC MARKERS:                                11.3   4.04  )-----------( 213      ( 02 Jul 2019 06:37 )             33.4     07-02    137  |  97<L>  |  22  ----------------------------<  90  3.9   |  24  |  0.87    Ca    9.4      02 Jul 2019 06:37  Mg     2.3     07-02      proBNP:   Lipid Profile:   HgA1c:   TSH:     TELE:  EKG:

## 2019-07-02 NOTE — PROGRESS NOTE ADULT - ASSESSMENT
&3 year old with history of PMR and prostate cancer presents with right lower extremity pain.  He has associated arthralgias.     He does not appear toxic.     I think an infectious etiology is inlikely. No pna on imaging.  Cultures have been negative      1) Abnormal imaging of the right leg  MRI with synovitis and baker cyst      2) Fever  Cxs negative    3) Joint pain  Polyarticular  lyme serology is negative  parvovirus pcr is negative    S/p arthrocentesis cell count from ankle and knee are both less that 25 K  Joint cultures are negative      4) Cough and abd pain  No pna on Ct  Could this be serositis    Monitor off abx.  Suspicion of inflection is low- Can give steroids if indicated by rheum

## 2019-07-02 NOTE — PROGRESS NOTE ADULT - PROBLEM SELECTOR PLAN 5
-s/p prostatectomy, no active issues  - anemia - GI to do EGD as outpt now that Hg stable
-s/p prostatectomy, no active issues  - family concerned about "metastatic disease"- given elevated ferritin and other inflammatory markers- will do ct chest and abd to r/o recurrence of prostate cancer or other malignancy  - anemia - GI to do EGD
-s/p prostatectomy, no active issues  - anemia - GI to do EGD as outpt now that Hg stable
-s/p prostatectomy, no active issues  - family concerned about "metastatic disease"- given elevated ferritin and other inflammatory markers- will do ct chest and abd to r/o recurrence of prostate cancer or other malignancy  - anemia - GI to do EGD

## 2019-07-02 NOTE — PROGRESS NOTE ADULT - PROBLEM SELECTOR PLAN 6
-lovenox for dvt ppx  PT c/s

## 2019-07-02 NOTE — PROGRESS NOTE ADULT - SUBJECTIVE AND OBJECTIVE BOX
Follow Up:      Inverval History/ROS:Patient is a 73y old  Male who presents with a chief complaint of RLE pain (02 Jul 2019 12:12)    Feels better today. Less chest pain.  No fever  Joint pain feels better    Allergies    No Known Allergies    Intolerances        ANTIMICROBIALS:      OTHER MEDS:  acetaminophen   Tablet .. 650 milliGRAM(s) Oral every 6 hours PRN  acetaminophen   Tablet .. 650 milliGRAM(s) Oral every 6 hours PRN  amiodarone    Tablet 200 milliGRAM(s) Oral two times a day  amiodarone Infusion 1 mG/Min IV Continuous <Continuous>  amiodarone Infusion 0.5 mG/Min IV Continuous <Continuous>  aspirin enteric coated 81 milliGRAM(s) Oral daily  atorvastatin 40 milliGRAM(s) Oral at bedtime  colchicine 0.6 milliGRAM(s) Oral daily  docusate sodium 100 milliGRAM(s) Oral three times a day  enoxaparin Injectable 40 milliGRAM(s) SubCutaneous daily  ethyl chloride Spray 1 Application(s) Topical once  lactated ringers. 1000 milliLiter(s) IV Continuous <Continuous>  lidocaine   Patch 2 Patch Transdermal daily  lidocaine 1% Injectable 2 milliLiter(s) Local Injection once  lidocaine 2% Injectable 1 milliLiter(s) Local Injection once  naproxen 500 milliGRAM(s) Oral two times a day  pantoprazole    Tablet 40 milliGRAM(s) Oral two times a day  polyethylene glycol 3350 17 Gram(s) Oral daily PRN  senna 2 Tablet(s) Oral at bedtime  sucralfate 1 Gram(s) Oral two times a day  traMADol 100 milliGRAM(s) Oral every 6 hours PRN      Vital Signs Last 24 Hrs  T(C): 36.7 (02 Jul 2019 13:04), Max: 36.7 (01 Jul 2019 20:39)  T(F): 98 (02 Jul 2019 13:04), Max: 98.1 (01 Jul 2019 20:39)  HR: 80 (02 Jul 2019 13:04) (80 - 82)  BP: 129/56 (02 Jul 2019 13:04) (105/42 - 143/66)  BP(mean): --  RR: 19 (02 Jul 2019 13:04) (18 - 20)  SpO2: 94% (02 Jul 2019 13:04) (94% - 97%)    PHYSICAL EXAM:  General: [x ] non-toxic  HEAD/EYES: [ ] PERRL x[ ] white sclera [ ] icterus  ENT:  [ ] normal [x ] supple [ ] thrush [ ] pharyngeal exudate  Cardiovascular:   [ ] murmur [x ] normal [ ] PPM/AICD  Respiratory:  [ ] clear to ausculation bilaterally  GI:  [x ] soft, non-tender, normal bowel sounds  :  [ ] hamlin [x ] no CVA tenderness   Musculoskeletal:  [ ] no synovitis  Neurologic:  [ ] non-focal exam   Skin:  [x ] no rash  Lymph: x[ ] no lymphadenopathy  Psychiatric:  [ x] appropriate affect [ ] alert & oriented  Lines:  [ x] no phlebitis [ ] central line                                11.3   4.04  )-----------( 213      ( 02 Jul 2019 06:37 )             33.4       07-02    137  |  97<L>  |  22  ----------------------------<  90  3.9   |  24  |  0.87    Ca    9.4      02 Jul 2019 06:37  Mg     2.3     07-02            MICROBIOLOGY:Culture - Surgical Site:   NO ORGANISMS ISOLATED AT 24 HOURS  NO ORGANISMS ISOLATED AT 48 HRS.  NO ORGANISMS ISOLATED AT 72 HRS. (06-28-19 @ 21:31)  Culture - Surgical Site:   NO ORGANISMS ISOLATED AT 24 HOURS  NO ORGANISMS ISOLATED AT 48 HRS.  NO ORGANISMS ISOLATED AT 72 HRS. (06-28-19 @ 21:29)      RADIOLOGY:

## 2019-07-02 NOTE — PROGRESS NOTE ADULT - PROBLEM SELECTOR PLAN 1
- trend h/h; appears to be at baseline  - transfuse prn   - cont protonix/carafate  - zofran prn   - maalox prn   - avoid nsaids  - iron studies reviewed, consider heme eval  - given hgb at baseline and no overt gi bleeding, agree with primary team to defer endoscopic evaluation at this time  - will continue to follow

## 2019-07-02 NOTE — PROGRESS NOTE ADULT - SUBJECTIVE AND OBJECTIVE BOX
INTERVAL HPI/OVERNIGHT EVENTS:    reports nausea after medication this morning on an empty stomach   has low appetite per wife bedside  no abd pain   no bloody stools       MEDICATIONS  (STANDING):  amiodarone    Tablet 200 milliGRAM(s) Oral two times a day  amiodarone Infusion 1 mG/Min (33.333 mL/Hr) IV Continuous <Continuous>  amiodarone Infusion 0.5 mG/Min (16.667 mL/Hr) IV Continuous <Continuous>  aspirin enteric coated 81 milliGRAM(s) Oral daily  atorvastatin 40 milliGRAM(s) Oral at bedtime  colchicine 0.6 milliGRAM(s) Oral daily  docusate sodium 100 milliGRAM(s) Oral three times a day  enoxaparin Injectable 40 milliGRAM(s) SubCutaneous daily  ethyl chloride Spray 1 Application(s) Topical once  lactated ringers. 1000 milliLiter(s) (75 mL/Hr) IV Continuous <Continuous>  lidocaine   Patch 2 Patch Transdermal daily  lidocaine 1% Injectable 2 milliLiter(s) Local Injection once  lidocaine 2% Injectable 1 milliLiter(s) Local Injection once  naproxen 500 milliGRAM(s) Oral two times a day  pantoprazole    Tablet 40 milliGRAM(s) Oral two times a day  senna 2 Tablet(s) Oral at bedtime  sucralfate 1 Gram(s) Oral two times a day    MEDICATIONS  (PRN):  acetaminophen   Tablet .. 650 milliGRAM(s) Oral every 6 hours PRN Mild Pain (1 - 3)  acetaminophen   Tablet .. 650 milliGRAM(s) Oral every 6 hours PRN Temp greater or equal to 38C (100.4F)  polyethylene glycol 3350 17 Gram(s) Oral daily PRN Constipation  traMADol 100 milliGRAM(s) Oral every 6 hours PRN moderate to severe pain      Allergies    No Known Allergies    Intolerances        Review of Systems:    General:  No wt loss, fevers, chills, night sweats, fatigue   Eyes:  Good vision, no reported pain  ENT:  No sore throat, pain, runny nose, dysphagia  CV:  No pain, palpitations, hypo/hypertension  Resp:  No dyspnea, cough, tachypnea, wheezing  GI:  No pain, +nausea, No vomiting, No diarrhea, No constipation, No weight loss, No fever, No pruritis, No rectal bleeding, No melena, No dysphagia  :  No pain, bleeding, incontinence, nocturia  Muscle:  No pain, weakness  Neuro:  No weakness, tingling, memory problems  Psych:  No fatigue, insomnia, mood problems, depression  Endocrine:  No polyuria, polydypsia, cold/heat intolerance  Heme:  No petechiae, ecchymosis, easy bruisability  Skin:  No rash, tattoos, scars, edema      Vital Signs Last 24 Hrs  T(C): 36.4 (02 Jul 2019 08:35), Max: 36.7 (01 Jul 2019 20:39)  T(F): 97.6 (02 Jul 2019 08:35), Max: 98.1 (01 Jul 2019 20:39)  HR: 80 (02 Jul 2019 08:35) (80 - 82)  BP: 125/63 (02 Jul 2019 08:35) (105/42 - 143/66)  BP(mean): --  RR: 20 (02 Jul 2019 08:35) (18 - 20)  SpO2: 96% (02 Jul 2019 08:35) (95% - 97%)    PHYSICAL EXAM:    Constitutional: NAD  HEENT: EOMI, throat clear  Neck: No LAD, supple  Respiratory: CTA and P  Cardiovascular: S1 and S2, RRR, no M  Gastrointestinal: BS+, soft, NT/ND, neg HSM,  Extremities: No peripheral edema, neg clubbing, cyanosis  Vascular: 2+ peripheral pulses  Neurological: A/O x 3, no focal deficits  Psychiatric: Normal mood, normal affect  Skin: No rashes      LABS:                        11.3   4.04  )-----------( 213      ( 02 Jul 2019 06:37 )             33.4     07-02    137  |  97<L>  |  22  ----------------------------<  90  3.9   |  24  |  0.87    Ca    9.4      02 Jul 2019 06:37  Mg     2.3     07-02            RADIOLOGY & ADDITIONAL TESTS:

## 2019-07-02 NOTE — PROGRESS NOTE ADULT - PROBLEM SELECTOR PLAN 4
-continue with Crestor- from home
-continue with Crestor- from home
-lovenox for dvt ppx  PT c/s
-continue with Crestor- from home
-continue with Crestor- from home

## 2019-07-03 ENCOUNTER — TRANSCRIPTION ENCOUNTER (OUTPATIENT)
Age: 74
End: 2019-07-03

## 2019-07-03 VITALS
RESPIRATION RATE: 18 BRPM | TEMPERATURE: 98 F | OXYGEN SATURATION: 98 % | SYSTOLIC BLOOD PRESSURE: 107 MMHG | HEART RATE: 72 BPM | DIASTOLIC BLOOD PRESSURE: 59 MMHG

## 2019-07-03 LAB
ANA TITR SER: NEGATIVE — SIGNIFICANT CHANGE UP
ANION GAP SERPL CALC-SCNC: 15 MMO/L — HIGH (ref 7–14)
BASOPHILS # BLD AUTO: 0.03 K/UL — SIGNIFICANT CHANGE UP (ref 0–0.2)
BASOPHILS NFR BLD AUTO: 0.7 % — SIGNIFICANT CHANGE UP (ref 0–2)
BUN SERPL-MCNC: 20 MG/DL — SIGNIFICANT CHANGE UP (ref 7–23)
CALCIUM SERPL-MCNC: 9 MG/DL — SIGNIFICANT CHANGE UP (ref 8.4–10.5)
CHLORIDE SERPL-SCNC: 100 MMOL/L — SIGNIFICANT CHANGE UP (ref 98–107)
CO2 SERPL-SCNC: 25 MMOL/L — SIGNIFICANT CHANGE UP (ref 22–31)
CREAT SERPL-MCNC: 0.89 MG/DL — SIGNIFICANT CHANGE UP (ref 0.5–1.3)
DSDNA AB FLD-ACNC: <0.2 — SIGNIFICANT CHANGE UP
DSDNA AB SER-ACNC: <12 IU/ML — SIGNIFICANT CHANGE UP
ENA RNP IGG SER-ACNC: 0.3 — SIGNIFICANT CHANGE UP
ENA SM AB TITR SER: < 0.2 — SIGNIFICANT CHANGE UP
ENA SS-A AB FLD IA-ACNC: <0.2 — SIGNIFICANT CHANGE UP
EOSINOPHIL # BLD AUTO: 0.01 K/UL — SIGNIFICANT CHANGE UP (ref 0–0.5)
EOSINOPHIL NFR BLD AUTO: 0.2 % — SIGNIFICANT CHANGE UP (ref 0–6)
GLUCOSE SERPL-MCNC: 116 MG/DL — HIGH (ref 70–99)
HCT VFR BLD CALC: 33.6 % — LOW (ref 39–50)
HGB BLD-MCNC: 11.2 G/DL — LOW (ref 13–17)
IMM GRANULOCYTES NFR BLD AUTO: 10.2 % — HIGH (ref 0–1.5)
LYMPHOCYTES # BLD AUTO: 0.69 K/UL — LOW (ref 1–3.3)
LYMPHOCYTES # BLD AUTO: 16.3 % — SIGNIFICANT CHANGE UP (ref 13–44)
MANUAL SMEAR VERIFICATION: SIGNIFICANT CHANGE UP
MCHC RBC-ENTMCNC: 32.7 PG — SIGNIFICANT CHANGE UP (ref 27–34)
MCHC RBC-ENTMCNC: 33.3 % — SIGNIFICANT CHANGE UP (ref 32–36)
MCV RBC AUTO: 98.2 FL — SIGNIFICANT CHANGE UP (ref 80–100)
MONOCYTES # BLD AUTO: 0.84 K/UL — SIGNIFICANT CHANGE UP (ref 0–0.9)
MONOCYTES NFR BLD AUTO: 19.9 % — HIGH (ref 2–14)
NEUTROPHILS # BLD AUTO: 2.23 K/UL — SIGNIFICANT CHANGE UP (ref 1.8–7.4)
NEUTROPHILS NFR BLD AUTO: 52.7 % — SIGNIFICANT CHANGE UP (ref 43–77)
NRBC # FLD: 0 K/UL — SIGNIFICANT CHANGE UP (ref 0–0)
PLATELET # BLD AUTO: 219 K/UL — SIGNIFICANT CHANGE UP (ref 150–400)
PMV BLD: 9.4 FL — SIGNIFICANT CHANGE UP (ref 7–13)
POTASSIUM SERPL-MCNC: 4.9 MMOL/L — SIGNIFICANT CHANGE UP (ref 3.5–5.3)
POTASSIUM SERPL-SCNC: 4.9 MMOL/L — SIGNIFICANT CHANGE UP (ref 3.5–5.3)
RBC # BLD: 3.42 M/UL — LOW (ref 4.2–5.8)
RBC # FLD: 15.4 % — HIGH (ref 10.3–14.5)
SODIUM SERPL-SCNC: 140 MMOL/L — SIGNIFICANT CHANGE UP (ref 135–145)
WBC # BLD: 4.23 K/UL — SIGNIFICANT CHANGE UP (ref 3.8–10.5)
WBC # FLD AUTO: 4.23 K/UL — SIGNIFICANT CHANGE UP (ref 3.8–10.5)
WNV IGG TITR FLD: NEGATIVE — SIGNIFICANT CHANGE UP

## 2019-07-03 PROCEDURE — 99239 HOSP IP/OBS DSCHRG MGMT >30: CPT

## 2019-07-03 RX ORDER — ACETAMINOPHEN 500 MG
2 TABLET ORAL
Qty: 0 | Refills: 0 | DISCHARGE
Start: 2019-07-03

## 2019-07-03 RX ORDER — SENNA PLUS 8.6 MG/1
2 TABLET ORAL
Qty: 0 | Refills: 0 | DISCHARGE
Start: 2019-07-03

## 2019-07-03 RX ORDER — DOCUSATE SODIUM 100 MG
1 CAPSULE ORAL
Qty: 0 | Refills: 0 | DISCHARGE
Start: 2019-07-03

## 2019-07-03 RX ORDER — ASPIRIN/CALCIUM CARB/MAGNESIUM 324 MG
1 TABLET ORAL
Qty: 0 | Refills: 0 | DISCHARGE
Start: 2019-07-03

## 2019-07-03 RX ORDER — LIDOCAINE 4 G/100G
2 CREAM TOPICAL
Qty: 28 | Refills: 0
Start: 2019-07-03 | End: 2019-07-16

## 2019-07-03 RX ORDER — COLCHICINE 0.6 MG
1 TABLET ORAL
Qty: 30 | Refills: 0
Start: 2019-07-03 | End: 2019-08-01

## 2019-07-03 RX ORDER — ROSUVASTATIN CALCIUM 5 MG/1
1 TABLET ORAL
Qty: 0 | Refills: 0 | DISCHARGE

## 2019-07-03 RX ORDER — AMIODARONE HYDROCHLORIDE 400 MG/1
1 TABLET ORAL
Qty: 30 | Refills: 0
Start: 2019-07-03 | End: 2019-08-01

## 2019-07-03 RX ORDER — PANTOPRAZOLE SODIUM 20 MG/1
1 TABLET, DELAYED RELEASE ORAL
Qty: 60 | Refills: 0
Start: 2019-07-03 | End: 2019-08-01

## 2019-07-03 RX ORDER — SUCRALFATE 1 G
1 TABLET ORAL
Qty: 60 | Refills: 0
Start: 2019-07-03 | End: 2019-08-01

## 2019-07-03 RX ORDER — AMIODARONE HYDROCHLORIDE 400 MG/1
1 TABLET ORAL
Qty: 60 | Refills: 0
Start: 2019-07-03 | End: 2019-08-01

## 2019-07-03 RX ORDER — POLYETHYLENE GLYCOL 3350 17 G/17G
17 POWDER, FOR SOLUTION ORAL
Qty: 0 | Refills: 0 | DISCHARGE
Start: 2019-07-03

## 2019-07-03 RX ADMIN — Medication 500 MILLIGRAM(S): at 00:25

## 2019-07-03 RX ADMIN — Medication 0.6 MILLIGRAM(S): at 12:21

## 2019-07-03 RX ADMIN — AMIODARONE HYDROCHLORIDE 200 MILLIGRAM(S): 400 TABLET ORAL at 06:29

## 2019-07-03 RX ADMIN — Medication 500 MILLIGRAM(S): at 13:15

## 2019-07-03 RX ADMIN — PANTOPRAZOLE SODIUM 40 MILLIGRAM(S): 20 TABLET, DELAYED RELEASE ORAL at 06:29

## 2019-07-03 RX ADMIN — Medication 81 MILLIGRAM(S): at 12:21

## 2019-07-03 RX ADMIN — SODIUM CHLORIDE 75 MILLILITER(S): 9 INJECTION, SOLUTION INTRAVENOUS at 06:30

## 2019-07-03 RX ADMIN — Medication 1 GRAM(S): at 06:29

## 2019-07-03 RX ADMIN — LIDOCAINE 2 PATCH: 4 CREAM TOPICAL at 12:21

## 2019-07-03 RX ADMIN — Medication 500 MILLIGRAM(S): at 12:21

## 2019-07-03 RX ADMIN — LIDOCAINE 2 PATCH: 4 CREAM TOPICAL at 01:00

## 2019-07-03 RX ADMIN — ENOXAPARIN SODIUM 40 MILLIGRAM(S): 100 INJECTION SUBCUTANEOUS at 12:21

## 2019-07-03 NOTE — DISCHARGE NOTE PROVIDER - CARE PROVIDER_API CALL
Romain Morris)  Cardiovascular Disease; Internal Medicine  [address] [facility], [facility] [address]  [place] [address]  Phone: [phone]  Fax: [phone]  Follow Up Time:     Nuria Ford)  Internal Medicine; Rheumatology  [address] [facility] [address] [facility], [facility] [address]  [place] [address]  Phone: [phone]  Fax: [phone]  Follow Up Time: 1 week

## 2019-07-03 NOTE — DISCHARGE NOTE PROVIDER - NSDCCPCAREPLAN_GEN_ALL_CORE_FT
PRINCIPAL DISCHARGE DIAGNOSIS  Diagnosis: Inflammatory arthritis  Assessment and Plan of Treatment: Continue prednisone for now as prescribed, please follow up with Rheumatology next week to review lab work and for further management.      SECONDARY DISCHARGE DIAGNOSES  Diagnosis: Atrial fibrillation, unspecified type  Assessment and Plan of Treatment: Please continue Amiodarone as prescribed, follow up with cardiology, Dr. Morris for further management.    Diagnosis: Hyperlipidemia  Assessment and Plan of Treatment: Low fat diet, exercise daily and continue current medications. Follow up with primary care physician and cardiologist for management. PRINCIPAL DISCHARGE DIAGNOSIS  Diagnosis: Inflammatory arthritis  Assessment and Plan of Treatment: Continue prednisone for now as prescribed, please follow up with Rheumatology next week to review lab work and for further management.      SECONDARY DISCHARGE DIAGNOSES  Diagnosis: Acute pericarditis  Assessment and Plan of Treatment: Improving, continue Colchicine for no, follow up with Dr. morris for monitoring.    Diagnosis: Atrial fibrillation, unspecified type  Assessment and Plan of Treatment: Please continue Amiodarone as prescribed, follow up with cardiology, Dr. Morris for further management.    Diagnosis: Hyperlipidemia  Assessment and Plan of Treatment: Low fat diet, exercise daily and continue current medications. Follow up with primary care physician and cardiologist for management.

## 2019-07-03 NOTE — PROGRESS NOTE ADULT - SUBJECTIVE AND OBJECTIVE BOX
Subjective: Patient seen and examined. No new events except as noted.     SUBJECTIVE/ROS:  feels better  much less sob or cp      MEDICATIONS:  MEDICATIONS  (STANDING):  amiodarone    Tablet 200 milliGRAM(s) Oral two times a day  amiodarone Infusion 1 mG/Min (33.333 mL/Hr) IV Continuous <Continuous>  amiodarone Infusion 0.5 mG/Min (16.667 mL/Hr) IV Continuous <Continuous>  aspirin enteric coated 81 milliGRAM(s) Oral daily  atorvastatin 40 milliGRAM(s) Oral at bedtime  colchicine 0.6 milliGRAM(s) Oral daily  docusate sodium 100 milliGRAM(s) Oral three times a day  enoxaparin Injectable 40 milliGRAM(s) SubCutaneous daily  ethyl chloride Spray 1 Application(s) Topical once  lactated ringers. 1000 milliLiter(s) (75 mL/Hr) IV Continuous <Continuous>  lidocaine   Patch 2 Patch Transdermal daily  lidocaine 1% Injectable 2 milliLiter(s) Local Injection once  lidocaine 2% Injectable 1 milliLiter(s) Local Injection once  naproxen 500 milliGRAM(s) Oral two times a day  pantoprazole    Tablet 40 milliGRAM(s) Oral two times a day  senna 2 Tablet(s) Oral at bedtime  sucralfate 1 Gram(s) Oral two times a day      PHYSICAL EXAM:  T(C): 36.4 (07-03-19 @ 06:26), Max: 36.9 (07-02-19 @ 22:36)  HR: 71 (07-03-19 @ 06:26) (71 - 83)  BP: 112/58 (07-03-19 @ 06:26) (112/58 - 129/56)  RR: 18 (07-03-19 @ 06:26) (18 - 19)  SpO2: 95% (07-03-19 @ 06:26) (94% - 95%)  Wt(kg): --  I&O's Summary    02 Jul 2019 07:01  -  03 Jul 2019 07:00  --------------------------------------------------------  IN: 1575 mL / OUT: 0 mL / NET: 1575 mL            JVP: Normal  Neck: supple  Lung: clear   CV: S1 S2 , Murmur:  Abd: soft  Ext: No edema  neuro: Awake / alert  Psych: flat affect  Skin: normal``    LABS/DATA:    CARDIAC MARKERS:                                11.2   4.23  )-----------( 219      ( 03 Jul 2019 06:14 )             33.6     07-03    140  |  100  |  20  ----------------------------<  116<H>  4.9   |  25  |  0.89    Ca    9.0      03 Jul 2019 06:14  Mg     2.3     07-02      proBNP:   Lipid Profile:   HgA1c:   TSH:     TELE:  EKG:

## 2019-07-03 NOTE — PROGRESS NOTE ADULT - REASON FOR ADMISSION
RLE pain

## 2019-07-03 NOTE — DISCHARGE NOTE NURSING/CASE MANAGEMENT/SOCIAL WORK - NSDCDPATPORTLINK_GEN_ALL_CORE
You can access the [email or web address] Portal, offered by [facility], by registering with the following website: [email or web address]

## 2019-07-03 NOTE — PROGRESS NOTE ADULT - PROBLEM SELECTOR PROBLEM 1
Right leg swelling
Anemia
Anemia
Atrial fibrillation, unspecified type
Atrial fibrillation, unspecified type
Anemia
Atrial fibrillation, unspecified type
Atrial fibrillation, unspecified type

## 2019-07-03 NOTE — PROGRESS NOTE ADULT - PROBLEM SELECTOR PROBLEM 2
Hyperlipidemia
Fever, unspecified fever cause
Leg swelling
Leg swelling
Fever, unspecified fever cause
Fever, unspecified fever cause
Leg swelling
Fever, unspecified fever cause

## 2019-07-03 NOTE — DISCHARGE NOTE PROVIDER - CARE PROVIDERS DIRECT ADDRESSES
,DirectAddress_Unknown,[email or web address]

## 2019-07-03 NOTE — CHART NOTE - NSCHARTNOTEFT_GEN_A_CORE
pt planned for dc  sig improved after steroids  spoke with rheum over the phone, agree with PO pred 20 daily and outpt f/u  CHEST good AE  ABD +BS soft, NT  EXT R>L  plans d/w daughters and wife at bedside and agree for dc  33 min spent with dc planning

## 2019-07-03 NOTE — PROGRESS NOTE ADULT - ATTENDING COMMENTS
Advanced care planning was discussed with patient and family.  Advanced care planning forms were reviewed and discussed.  Risks, benefits and alternatives of gastroenterologic procedures were discussed in detail and all questions were answered.    30 minutes spent.
wife and daughters at bedside  all questions answered   plan for day agreed upon  await rheum f/u
---
AA
Patient would like to follow up with her outpatient Physicians for anemia workup.

## 2019-07-03 NOTE — PROGRESS NOTE ADULT - SUBJECTIVE AND OBJECTIVE BOX
INTERVAL HPI/OVERNIGHT EVENTS:    pt is without abd pain   no bloody bm's  reports some intermittent nausea and low appetite    MEDICATIONS  (STANDING):  amiodarone    Tablet 200 milliGRAM(s) Oral two times a day  amiodarone Infusion 1 mG/Min (33.333 mL/Hr) IV Continuous <Continuous>  amiodarone Infusion 0.5 mG/Min (16.667 mL/Hr) IV Continuous <Continuous>  aspirin enteric coated 81 milliGRAM(s) Oral daily  atorvastatin 40 milliGRAM(s) Oral at bedtime  colchicine 0.6 milliGRAM(s) Oral daily  docusate sodium 100 milliGRAM(s) Oral three times a day  enoxaparin Injectable 40 milliGRAM(s) SubCutaneous daily  ethyl chloride Spray 1 Application(s) Topical once  lactated ringers. 1000 milliLiter(s) (75 mL/Hr) IV Continuous <Continuous>  lidocaine   Patch 2 Patch Transdermal daily  lidocaine 1% Injectable 2 milliLiter(s) Local Injection once  lidocaine 2% Injectable 1 milliLiter(s) Local Injection once  naproxen 500 milliGRAM(s) Oral two times a day  pantoprazole    Tablet 40 milliGRAM(s) Oral two times a day  senna 2 Tablet(s) Oral at bedtime  sucralfate 1 Gram(s) Oral two times a day    MEDICATIONS  (PRN):  acetaminophen   Tablet .. 650 milliGRAM(s) Oral every 6 hours PRN Mild Pain (1 - 3)  acetaminophen   Tablet .. 650 milliGRAM(s) Oral every 6 hours PRN Temp greater or equal to 38C (100.4F)  polyethylene glycol 3350 17 Gram(s) Oral daily PRN Constipation  traMADol 100 milliGRAM(s) Oral every 6 hours PRN moderate to severe pain      Allergies    No Known Allergies    Intolerances        Review of Systems:    General:  No wt loss, fevers, chills, night sweats, fatigue   Eyes:  Good vision, no reported pain  ENT:  No sore throat, pain, runny nose, dysphagia  CV:  No pain, palpitations, hypo/hypertension  Resp:  No dyspnea, cough, tachypnea, wheezing  GI:  No pain, +nausea, No vomiting, No diarrhea, No constipation, No weight loss, No fever, No pruritis, No rectal bleeding, No melena, No dysphagia  :  No pain, bleeding, incontinence, nocturia  Muscle:  No pain, weakness  Neuro:  No weakness, tingling, memory problems  Psych:  No fatigue, insomnia, mood problems, depression  Endocrine:  No polyuria, polydypsia, cold/heat intolerance  Heme:  No petechiae, ecchymosis, easy bruisability  Skin:  No rash, tattoos, scars, edema      Vital Signs Last 24 Hrs  T(C): 36.8 (03 Jul 2019 12:07), Max: 36.9 (02 Jul 2019 22:36)  T(F): 98.3 (03 Jul 2019 12:07), Max: 98.5 (02 Jul 2019 22:36)  HR: 72 (03 Jul 2019 12:07) (71 - 83)  BP: 107/59 (03 Jul 2019 12:07) (107/59 - 129/56)  BP(mean): --  RR: 18 (03 Jul 2019 12:07) (18 - 19)  SpO2: 98% (03 Jul 2019 12:07) (94% - 98%)    PHYSICAL EXAM:    Constitutional: NAD  HEENT: EOMI, throat clear  Neck: No LAD, supple  Respiratory: CTA and P  Cardiovascular: S1 and S2, RRR, no M  Gastrointestinal: BS+, soft, NT/ND, neg HSM,  Extremities: No peripheral edema, neg clubbing, cyanosis  Vascular: 2+ peripheral pulses  Neurological: A/O x 3, no focal deficits  Psychiatric: Normal mood, normal affect  Skin: No rashes      LABS:                        11.2   4.23  )-----------( 219      ( 03 Jul 2019 06:14 )             33.6     07-03    140  |  100  |  20  ----------------------------<  116<H>  4.9   |  25  |  0.89    Ca    9.0      03 Jul 2019 06:14  Mg     2.3     07-02            RADIOLOGY & ADDITIONAL TESTS:

## 2019-07-03 NOTE — PROGRESS NOTE ADULT - ASSESSMENT
acute pericarditis   improving clinically   cont colchicine     afib  converted to sinus  recurrent   will hold off to a/c as he may convert to hemorrhagic pleural / pericardial effusion   cont amio , 200 BID for now and dc on 200 on daily

## 2019-07-03 NOTE — DISCHARGE NOTE PROVIDER - HOSPITAL COURSE
73 year old M PMH Polymyalgia rheumatica (inactive for several years), Prostate Ca s/p prostatectomy, HLD presents with progressive RLE swelling and pain. Patient reports onset of right ankle swelling and pain 6/17 followed by pain in the right calf and right knee. He was treated with a medrol dose pack without relief. He returned this week Monday for a cortisone injection in the ankle by podiatry. Since then states pain has intensified and he is unable to walk without assistance of a walker. Denies fevers, chills however wife notes new night sweats during which patient will wake up drenched in sweat. Denies weight loss, no rashes, abdominal pain, N/V, diarrhea or myalgias. Notes mild chest pain only when taking a deep breath which he reports started after likely straining a muscle while trying to ambulate, denies shortness of breath, cough or exertional chest pain.    In the ED, Tmax: 98.2  HR 82 - 93 BP: 105/55 - 109/54 RR: 17 - 18 SpO2: 99% - 100%. Pt had an US of the RLE which demonstrated a 4 cm hypoechoic lesion in the right calf near the right posterior tibial vein. Xray of the right leg significant for no acute fracture or dislocation, preserved Joint spaces, Small knee joint effusion. Small plantar calcaneal enthesophyte with mild soft tissue swelling noted around the ankle.         1. Right leg swelling.  Plan:     - Xrays reviewed, small right knee effusion and soft tissue swelling around ankle, no evidence of acute fracture. US negative for DVT, significant for hypoechoic lesion likely hematoma vs cyst, unlikely to fully explain pain.    - ESR and CRP noted to be elevated, with reported history of night sweats.    - RF positve 23, Uric acid and CK negative     -  QUENTIN, [id number],  CCP results pending     - Concern for inflammatory arthropathy given h/o PMR however unlikely     - Pt s/p medrol pack with mild improvement, may require extended taper. Will trial on naproxen 500 mg BID for now given normal renal function, no bleeding risks. Percocet as needed for severe breakthrough pain     - PT consult given impaired mobility 2/2 to pain. --> home PT     - Rheumatology c/s in AM. Arthrocentesis of Right Knee and Right Ankle performed 6/28    MRI R tib/fib and knee - IMPRESSION: Large joint effusion and synovitis. Medial and lateral compartment meniscal tears and mild chondral wear. Small Baker cyst decompressing along the medial head of the gastrocnemius, likely representing cystic focus appreciated on prior ultrasound. Diffuse cutaneous edema with prominent subcutaneous fluid along the lateral aspect of the knee. No osseous signal abnormality. Nonspecific muscle edema. Constellation of findings appears inflammatory etiology given patient's  history of polymyalgia rheumatica.    ddx: seronegative RA/inflammatory vs SLE     RA can be considered, but atypical presentation given asymmetric arthritis that happened acutely. Serologically negative.     Lupus considered now since pt has arthritis and possible pericarditis, b/l pleural effusions    ID c/s: Monitor off abx.    Suspicion of inflection is low- Can give steroids if indicated by rheum             2. Atrial fibrillation, - now in SINUS     - likely stress response to underlying inflammation vs. ?infection in body    seen by cards    - amiodarone load initiated; converted to sinus.     - pro BNP elevated- pt w/ minimal po intake w/ soft bp's- will watch for now.     < from: Transthoracic Echocardiogram (06.30.19 @ 13:52) >    Minimal mitral regurgitation.probable mild aortic stenosis.    3. Endocardium not well visualized; grossly normal left    ventricular systolic function.. Normal right ventricular size and function        3. Fever, unspecified fever cause.  Plan:    - cultures sent    - empiric abx for cough- cxr clear- was on zosyn but per ID recs de-escalated    - awaiting rheum input as well- unclear if cell count in arthrocentesis is affected by systemic steroids recently.     - 6/29 CT C/A/P: No focal consolidation in the lungs. Small right pleural     effusion.     - RVP negative         4. CP    - CT C/A/P- No focal consolidation in the lungs. Small right pleural effusion.    - Dimer positive, CTPA ordered-No pulmonary embolism.    - ECHO ordered         Echo:1. Mitral annular calcification, otherwise normal mitral    valve. Minimal mitral regurgitation.    2. Aortic valve leaflet morphology not well visualized Peak    transaortic valve gradient equals 22 mm Hg, mean    transaortic valve gradient equals 11 mm Hg, consistent with    probable mild aortic stenosis.    3. Endocardium not well visualized; grossly normal left    ventricular systolic function.    4. Normal right ventricular size and function. 73 year old M PMH Polymyalgia rheumatica (inactive for several years), Prostate Ca s/p prostatectomy, HLD presents with progressive RLE swelling and pain. Patient reports onset of right ankle swelling and pain 6/17 followed by pain in the right calf and right knee. He was treated with a medrol dose pack without relief. He returned this week Monday for a cortisone injection in the ankle by podiatry. Since then states pain has intensified and he is unable to walk without assistance of a walker. Denies fevers, chills however wife notes new night sweats during which patient will wake up drenched in sweat. Denies weight loss, no rashes, abdominal pain, N/V, diarrhea or myalgias. Notes mild chest pain only when taking a deep breath which he reports started after likely straining a muscle while trying to ambulate, denies shortness of breath, cough or exertional chest pain.    In the ED, Tmax: 98.2  HR 82 - 93 BP: 105/55 - 109/54 RR: 17 - 18 SpO2: 99% - 100%. Pt had an US of the RLE which demonstrated a 4 cm hypoechoic lesion in the right calf near the right posterior tibial vein. Xray of the right leg significant for no acute fracture or dislocation, preserved Joint spaces, Small knee joint effusion. Small plantar calcaneal enthesophyte with mild soft tissue swelling noted around the ankle.         1. Right leg swelling.     - Xrays reviewed, small right knee effusion and soft tissue swelling around ankle, no evidence of acute fracture. US negative for DVT, significant for hypoechoic lesion likely hematoma vs cyst, unlikely to fully explain pain.    - ESR and CRP noted to be elevated, with reported history of night sweats.    - RF positve 23, Uric acid and CK negative     -  QUENTIN, [id number],  CCP results pending     - Concern for inflammatory arthropathy given h/o PMR however unlikely     - Pt s/p medrol pack with mild improvement, may require extended taper. Will trial on naproxen 500 mg BID for now given normal renal function, no bleeding risks. Percocet was given as needed for severe breakthrough pain     - PT consult given impaired mobility 2/2 to pain. --> home PT     - Rheumatology c/s : s/p Arthrocentesis of Right Knee and Right Ankle performed 6/28    MRI R tib/fib and knee - IMPRESSION: Large joint effusion and synovitis. Medial and lateral compartment meniscal tears and mild chondral wear. Small Baker cyst decompressing along the medial head of the gastrocnemius, likely representing cystic focus appreciated on prior ultrasound. Diffuse cutaneous edema with prominent subcutaneous fluid along the lateral aspect of the knee. No osseous signal abnormality. Nonspecific muscle edema. Constellation of findings appears inflammatory etiology given patient's  history of polymyalgia rheumatica.    ddx: seronegative RA/inflammatory vs SLE     RA can be considered, but atypical presentation given asymmetric arthritis that happened acutely. Serologically negative.     Lupus considered now since pt has arthritis and possible pericarditis, b/l pleural effusions         2. Atrial fibrillation, - now in SINUS     - likely stress response to underlying inflammation vs. ?infection in body    seen by cards: hold of on AC given increased risk for development of pleural / pericardial effusion, cont amio , 200 BID for now and dc on 200 on daily      - amiodarone load initiated; converted to sinus.     - pro BNP elevated    Echo:1. Mitral annular calcification, otherwise normal mitral    valve. Minimal mitral regurgitation.    2. Aortic valve leaflet morphology not well visualized Peak    transaortic valve gradient equals 22 mm Hg, mean    transaortic valve gradient equals 11 mm Hg, consistent with    probable mild aortic stenosis.    3. Endocardium not well visualized; grossly normal left    ventricular systolic function.    4. Normal right ventricular size and function.        3. Fever, unspecified fever cause.  Plan:    - cultures sent    - empiric abx for cough- cxr clear- was on zosyn but per ID recs de-escalated    - 6/29 CT C/A/P: No focal consolidation in the lungs. Small right pleural     effusion.     - RVP negative     ID c/s: Monitor off abx.    Suspicion of inflection is low- Can give steroids if indicated by rheum        4. CP    - CT C/A/P- No focal consolidation in the lungs. Small right pleural effusion.    - Dimer positive, CTPA ordered-No pulmonary embolism.            Pt cleared for discharge, able to walk with PT and do stairs, pt to follow up with Rheumatology as outpatient.

## 2019-07-03 NOTE — PROGRESS NOTE ADULT - PROBLEM SELECTOR PLAN 2
- care per primary team and ID
-continue with Crestor
apprec ID f/u  stop abx
cultures sent- blood and joint aspirates neg thus far- CT scans neg as well- will f/u cx - if neg for 48h then dc abx
apprec ID f/u  stop abx
cultures sent  empiric abx for cough- cxr clear- was on zosyn but per ID recs de-escalated  awaiting rheum input as well- unclear if cell count in arthrocentesis is affected by systemic steroids recently

## 2019-07-03 NOTE — PROGRESS NOTE ADULT - PROBLEM SELECTOR PROBLEM 3
ACP (advance care planning)
Prostate cancer
Right leg swelling

## 2019-07-03 NOTE — PROGRESS NOTE ADULT - PROVIDER SPECIALTY LIST ADULT
Cardiology
Gastroenterology
Hospitalist
Infectious Disease
Rheumatology
Hospitalist
Infectious Disease
Hospitalist
Gastroenterology
Gastroenterology
Hospitalist
Hospitalist

## 2019-07-04 LAB
BACTERIA BLD CULT: SIGNIFICANT CHANGE UP
BACTERIA BLD CULT: SIGNIFICANT CHANGE UP
CULTURE - SURGICAL SITE: SIGNIFICANT CHANGE UP
CULTURE - SURGICAL SITE: SIGNIFICANT CHANGE UP

## 2019-07-05 ENCOUNTER — INBOUND DOCUMENT (OUTPATIENT)
Age: 74
End: 2019-07-05

## 2019-07-05 LAB
HAV IGM SER-ACNC: NONREACTIVE — SIGNIFICANT CHANGE UP
HBV CORE IGM SER-ACNC: NONREACTIVE — SIGNIFICANT CHANGE UP
HBV SURFACE AG SER-ACNC: NONREACTIVE — SIGNIFICANT CHANGE UP
HCV AB S/CO SERPL IA: 0.17 S/CO — SIGNIFICANT CHANGE UP (ref 0–0.99)
HCV AB SERPL-IMP: SIGNIFICANT CHANGE UP

## 2019-07-08 PROBLEM — M35.3 POLYMYALGIA RHEUMATICA: Chronic | Status: ACTIVE | Noted: 2019-06-27

## 2019-07-08 PROBLEM — C61 MALIGNANT NEOPLASM OF PROSTATE: Chronic | Status: ACTIVE | Noted: 2019-06-27

## 2019-07-08 LAB — WNV IGM SPEC QL: NEGATIVE — SIGNIFICANT CHANGE UP

## 2019-07-10 ENCOUNTER — APPOINTMENT (OUTPATIENT)
Dept: RHEUMATOLOGY | Facility: CLINIC | Age: 74
End: 2019-07-10
Payer: MEDICARE

## 2019-07-10 ENCOUNTER — LABORATORY RESULT (OUTPATIENT)
Age: 74
End: 2019-07-10

## 2019-07-10 VITALS — DIASTOLIC BLOOD PRESSURE: 70 MMHG | HEART RATE: 78 BPM | SYSTOLIC BLOOD PRESSURE: 133 MMHG

## 2019-07-10 DIAGNOSIS — Z79.899 OTHER LONG TERM (CURRENT) DRUG THERAPY: ICD-10-CM

## 2019-07-10 DIAGNOSIS — E78.5 HYPERLIPIDEMIA, UNSPECIFIED: ICD-10-CM

## 2019-07-10 DIAGNOSIS — C61 MALIGNANT NEOPLASM OF PROSTATE: ICD-10-CM

## 2019-07-10 DIAGNOSIS — R79.9 ABNORMAL FINDING OF BLOOD CHEMISTRY, UNSPECIFIED: ICD-10-CM

## 2019-07-10 DIAGNOSIS — I48.91 UNSPECIFIED ATRIAL FIBRILLATION: ICD-10-CM

## 2019-07-10 PROCEDURE — 99214 OFFICE O/P EST MOD 30 MIN: CPT

## 2019-07-11 ENCOUNTER — APPOINTMENT (OUTPATIENT)
Dept: VASCULAR SURGERY | Facility: CLINIC | Age: 74
End: 2019-07-11
Payer: MEDICARE

## 2019-07-11 VITALS — DIASTOLIC BLOOD PRESSURE: 71 MMHG | HEART RATE: 78 BPM | SYSTOLIC BLOOD PRESSURE: 123 MMHG

## 2019-07-11 VITALS
TEMPERATURE: 97.7 F | HEIGHT: 69 IN | WEIGHT: 170 LBS | BODY MASS INDEX: 25.18 KG/M2 | DIASTOLIC BLOOD PRESSURE: 70 MMHG | SYSTOLIC BLOOD PRESSURE: 135 MMHG | HEART RATE: 77 BPM

## 2019-07-11 PROCEDURE — 93970 EXTREMITY STUDY: CPT

## 2019-07-11 PROCEDURE — 99203 OFFICE O/P NEW LOW 30 MIN: CPT

## 2019-07-14 ENCOUNTER — FORM ENCOUNTER (OUTPATIENT)
Age: 74
End: 2019-07-14

## 2019-07-15 ENCOUNTER — OUTPATIENT (OUTPATIENT)
Dept: OUTPATIENT SERVICES | Facility: HOSPITAL | Age: 74
LOS: 1 days | End: 2019-07-15
Payer: MEDICARE

## 2019-07-15 ENCOUNTER — APPOINTMENT (OUTPATIENT)
Dept: MRI IMAGING | Facility: CLINIC | Age: 74
End: 2019-07-15
Payer: MEDICARE

## 2019-07-15 DIAGNOSIS — R79.9 ABNORMAL FINDING OF BLOOD CHEMISTRY, UNSPECIFIED: ICD-10-CM

## 2019-07-15 DIAGNOSIS — M06.4 INFLAMMATORY POLYARTHROPATHY: ICD-10-CM

## 2019-07-15 DIAGNOSIS — Z90.79 ACQUIRED ABSENCE OF OTHER GENITAL ORGAN(S): Chronic | ICD-10-CM

## 2019-07-15 DIAGNOSIS — M25.579 PAIN IN UNSPECIFIED ANKLE AND JOINTS OF UNSPECIFIED FOOT: ICD-10-CM

## 2019-07-15 DIAGNOSIS — Z87.19 PERSONAL HISTORY OF OTHER DISEASES OF THE DIGESTIVE SYSTEM: Chronic | ICD-10-CM

## 2019-07-15 LAB
ALBUMIN MFR SERPL ELPH: 36.4 %
ALBUMIN SERPL ELPH-MCNC: 2.7 G/DL
ALBUMIN SERPL-MCNC: 2.2 G/DL
ALBUMIN/GLOB SERPL: 0.6 RATIO
ALP BLD-CCNC: 145 U/L
ALPHA1 GLOB MFR SERPL ELPH: 13.2 %
ALPHA1 GLOB SERPL ELPH-MCNC: 0.8 G/DL
ALPHA2 GLOB MFR SERPL ELPH: 19.9 %
ALPHA2 GLOB SERPL ELPH-MCNC: 1.2 G/DL
ALT SERPL-CCNC: 131 U/L
ANION GAP SERPL CALC-SCNC: 18 MMOL/L
APPEARANCE: CLEAR
AST SERPL-CCNC: 66 U/L
B-GLOBULIN MFR SERPL ELPH: 16.2 %
B-GLOBULIN SERPL ELPH-MCNC: 1 G/DL
BACTERIA: NEGATIVE
BASOPHILS # BLD AUTO: 0.04 K/UL
BASOPHILS NFR BLD AUTO: 0.4 %
BILIRUB SERPL-MCNC: 0.4 MG/DL
BILIRUBIN URINE: NEGATIVE
BLOOD URINE: NEGATIVE
BUN SERPL-MCNC: 25 MG/DL
CALCIUM SERPL-MCNC: 9.1 MG/DL
CCP AB SER IA-ACNC: <8 UNITS
CHLORIDE SERPL-SCNC: 100 MMOL/L
CO2 SERPL-SCNC: 23 MMOL/L
COLOR: YELLOW
CREAT SERPL-MCNC: 0.91 MG/DL
CREAT SPEC-SCNC: 136 MG/DL
CREAT/PROT UR: 0.5 RATIO
CRP SERPL-MCNC: 17.8 MG/DL
DEPRECATED KAPPA LC FREE/LAMBDA SER: 0.96 RATIO
EOSINOPHIL # BLD AUTO: 0.03 K/UL
EOSINOPHIL NFR BLD AUTO: 0.3 %
ERYTHROCYTE [SEDIMENTATION RATE] IN BLOOD BY WESTERGREN METHOD: 9 MM/HR
ESTIMATED AVERAGE GLUCOSE: 123 MG/DL
FERRITIN SERPL-MCNC: 2654 NG/ML
GAMMA GLOB FLD ELPH-MCNC: 0.9 G/DL
GAMMA GLOB MFR SERPL ELPH: 14.3 %
GLUCOSE QUALITATIVE U: NEGATIVE
GLUCOSE SERPL-MCNC: 90 MG/DL
HBA1C MFR BLD HPLC: 5.9 %
HCT VFR BLD CALC: 32.7 %
HGB BLD-MCNC: 10.3 G/DL
HYALINE CASTS: 1 /LPF
IGA SER QL IEP: 559 MG/DL
IGG SER QL IEP: 927 MG/DL
IGM SER QL IEP: 79 MG/DL
IMM GRANULOCYTES NFR BLD AUTO: 5.7 %
INTERPRETATION SERPL IEP-IMP: NORMAL
KAPPA LC CSF-MCNC: 5 MG/DL
KAPPA LC SERPL-MCNC: 4.81 MG/DL
KETONES URINE: NEGATIVE
LEUKOCYTE ESTERASE URINE: NEGATIVE
LYMPHOCYTES # BLD AUTO: 1.61 K/UL
LYMPHOCYTES NFR BLD AUTO: 16 %
M PROTEIN MFR SERPL ELPH: NORMAL
M PROTEIN SPEC IFE-MCNC: NORMAL
MAN DIFF?: NORMAL
MCHC RBC-ENTMCNC: 31.5 GM/DL
MCHC RBC-ENTMCNC: 32.7 PG
MCV RBC AUTO: 103.8 FL
MICROSCOPIC-UA: NORMAL
MONOCLON BAND OBS SERPL: NORMAL
MONOCYTES # BLD AUTO: 1.55 K/UL
MONOCYTES NFR BLD AUTO: 15.4 %
NEUTROPHILS # BLD AUTO: 6.27 K/UL
NEUTROPHILS NFR BLD AUTO: 62.2 %
NITRITE URINE: NEGATIVE
PH URINE: 5.5
PLATELET # BLD AUTO: 271 K/UL
POTASSIUM SERPL-SCNC: 5 MMOL/L
PROT SERPL-MCNC: 6.1 G/DL
PROT UR-MCNC: 66 MG/DL
PROTEIN URINE: ABNORMAL
RBC # BLD: 3.15 M/UL
RBC # FLD: 15.9 %
RED BLOOD CELLS URINE: 4 /HPF
RF+CCP IGG SER-IMP: NEGATIVE
RHEUMATOID FACT SER QL: 20 IU/ML
SODIUM SERPL-SCNC: 141 MMOL/L
SPECIFIC GRAVITY URINE: 1.03
SQUAMOUS EPITHELIAL CELLS: 1 /HPF
URINE COMMENTS: NORMAL
UROBILINOGEN URINE: NORMAL
WBC # FLD AUTO: 10.07 K/UL
WHITE BLOOD CELLS URINE: 3 /HPF

## 2019-07-15 PROCEDURE — A9585: CPT

## 2019-07-15 PROCEDURE — 73723 MRI JOINT LWR EXTR W/O&W/DYE: CPT

## 2019-07-15 PROCEDURE — 73723 MRI JOINT LWR EXTR W/O&W/DYE: CPT | Mod: 26,RT

## 2019-07-16 ENCOUNTER — APPOINTMENT (OUTPATIENT)
Dept: ULTRASOUND IMAGING | Facility: CLINIC | Age: 74
End: 2019-07-16

## 2019-07-17 ENCOUNTER — APPOINTMENT (OUTPATIENT)
Dept: RHEUMATOLOGY | Facility: CLINIC | Age: 74
End: 2019-07-17
Payer: MEDICARE

## 2019-07-17 VITALS
DIASTOLIC BLOOD PRESSURE: 64 MMHG | WEIGHT: 155 LBS | OXYGEN SATURATION: 97 % | SYSTOLIC BLOOD PRESSURE: 103 MMHG | TEMPERATURE: 97.7 F | HEART RATE: 82 BPM | HEIGHT: 69 IN | BODY MASS INDEX: 22.96 KG/M2

## 2019-07-17 DIAGNOSIS — Z87.39 PERSONAL HISTORY OF OTHER DISEASES OF THE MUSCULOSKELETAL SYSTEM AND CONNECTIVE TISSUE: ICD-10-CM

## 2019-07-17 DIAGNOSIS — M06.4 INFLAMMATORY POLYARTHROPATHY: ICD-10-CM

## 2019-07-17 DIAGNOSIS — M25.461 EFFUSION, RIGHT KNEE: ICD-10-CM

## 2019-07-17 DIAGNOSIS — M25.462 EFFUSION, RIGHT KNEE: ICD-10-CM

## 2019-07-17 PROCEDURE — 99214 OFFICE O/P EST MOD 30 MIN: CPT

## 2019-07-17 RX ORDER — TRAMADOL HYDROCHLORIDE AND ACETAMINOPHEN 37.5; 325 MG/1; MG/1
37.5-325 TABLET, FILM COATED ORAL
Qty: 120 | Refills: 0 | Status: ACTIVE | COMMUNITY
Start: 2019-07-17 | End: 1900-01-01

## 2019-07-18 ENCOUNTER — MESSAGE (OUTPATIENT)
Age: 74
End: 2019-07-18

## 2019-07-18 PROBLEM — Z87.39 HISTORY OF POLYMYALGIA RHEUMATICA: Status: RESOLVED | Noted: 2019-07-10 | Resolved: 2019-07-18

## 2019-07-18 PROBLEM — M25.461 BILATERAL KNEE EFFUSIONS: Status: RESOLVED | Noted: 2019-07-10 | Resolved: 2019-07-18

## 2019-07-18 PROBLEM — M06.4 INFLAMMATORY POLYARTHROPATHY: Status: RESOLVED | Noted: 2019-07-10 | Resolved: 2019-07-18

## 2019-07-18 RX ORDER — PANTOPRAZOLE 40 MG/1
40 TABLET, DELAYED RELEASE ORAL
Refills: 0 | Status: COMPLETED | COMMUNITY

## 2019-07-18 RX ORDER — COLCHICINE 0.6 MG/1
0.6 TABLET ORAL
Refills: 0 | Status: COMPLETED | COMMUNITY

## 2019-07-18 RX ORDER — NAPROXEN 500 MG/1
500 TABLET ORAL
Refills: 0 | Status: COMPLETED | COMMUNITY

## 2019-07-18 RX ORDER — ROSUVASTATIN CALCIUM 10 MG/1
10 TABLET, FILM COATED ORAL
Refills: 0 | Status: ACTIVE | COMMUNITY

## 2019-07-18 RX ORDER — DOCUSATE SODIUM 100 MG/1
100 CAPSULE, LIQUID FILLED ORAL
Refills: 0 | Status: COMPLETED | COMMUNITY

## 2019-07-18 RX ORDER — AMIODARONE HYDROCHLORIDE 200 MG/1
200 TABLET ORAL
Refills: 0 | Status: ACTIVE | COMMUNITY

## 2019-07-18 RX ORDER — SENNOSIDES 8.6 MG TABLETS 8.6 MG/1
8.6 TABLET ORAL
Refills: 0 | Status: COMPLETED | COMMUNITY

## 2019-07-18 RX ORDER — PREDNISONE 20 MG/1
20 TABLET ORAL
Qty: 90 | Refills: 1 | Status: DISCONTINUED | COMMUNITY
Start: 2019-07-10 | End: 2019-07-18

## 2019-07-18 RX ORDER — SUCRALFATE 1 G/1
1 TABLET ORAL
Refills: 0 | Status: COMPLETED | COMMUNITY

## 2019-07-20 ENCOUNTER — MEDICATION RENEWAL (OUTPATIENT)
Age: 74
End: 2019-07-20

## 2019-07-20 DIAGNOSIS — M87.00 IDIOPATHIC ASEPTIC NECROSIS OF UNSPECIFIED BONE: ICD-10-CM

## 2019-07-20 DIAGNOSIS — M25.579 PAIN IN UNSPECIFIED ANKLE AND JOINTS OF UNSPECIFIED FOOT: ICD-10-CM

## 2019-07-20 RX ORDER — NAPROXEN 500 MG/1
500 TABLET ORAL
Qty: 60 | Refills: 1 | Status: ACTIVE | COMMUNITY
Start: 2019-07-20 | End: 1900-01-01

## 2019-08-16 ENCOUNTER — APPOINTMENT (OUTPATIENT)
Dept: RHEUMATOLOGY | Facility: CLINIC | Age: 74
End: 2019-08-16

## 2020-06-30 NOTE — PROCEDURE NOTE - ATTENDING PROVIDER
----- Message from JUAN Savage sent at 6/30/2020  8:26 AM CDT -----  Please notify the patient of normal/stable results. No medication changes. Continue with a healthy diet (maybe increase protein intake given slightly low albumin level) and regular low level exercise.  Follow-up as planned.  
Contacted patient, results and recommendations per provider Caesar were relayed.  Patient verbalized understanding, agrees with plan of care and has no further questions at this time.   
Dr. Ford
Dr. Ford

## 2021-04-01 NOTE — PROGRESS NOTE ADULT - ASSESSMENT
&3 year old with history of PMR and prostate cancer presents with right lower extremity pain.  He has associated arthralgias.     He does not appear toxic.     My concern for infection is not high but elevated bands and procalcitonin are concerning    1) Abnormal imaging of the right leg    Consider MRI of the lag      2) Bandemia with elevated procalcitonin    Hard to interpret in absence of more focal exam and abscence of fever  Blood culture without growth   Repeat differential is okay    3) Joint pain  Polyarticular  lyme serology is negative  Check parvovirus pcr    S/p arthrocentesis cell count from ankle and knee are both less that 25 K  Joint cultures are negative    Can stop ceftriaxone    4) Cough and abd pain  No pna on Ct  Could this be serositis shortness of breath

## 2022-05-13 NOTE — PATIENT PROFILE ADULT - NSPROMEDSADMININFO_GEN_A_NUR
Anesthesia Post Evaluation    Patient: Srinivasan Henriquez    Procedure(s) Performed: Procedure(s) (LRB):  L1-S1 Laminectomy with Fusion (N/A)    Final Anesthesia Type: general      Patient location during evaluation: PACU  Patient participation: Yes- Able to Participate  Level of consciousness: awake and sedated  Post-procedure vital signs: reviewed and stable  Pain management: adequate  Airway patency: patent    PONV status at discharge: No PONV  Anesthetic complications: no      Cardiovascular status: blood pressure returned to baseline  Respiratory status: unassisted  Hydration status: euvolemic  Follow-up not needed.          Vitals Value Taken Time   /71 05/13/22 1315   Temp 37 °C (98.6 °F) 05/13/22 1225   Pulse 85 05/13/22 1318   Resp 16 05/13/22 1350   SpO2 95 % 05/13/22 1318   Vitals shown include unvalidated device data.      Event Time   Out of Recovery 05/13/2022 13:18:00         Pain/Juan Score: Pain Rating Prior to Med Admin: 5 (5/13/2022  1:50 PM)  Juan Score: 9 (5/13/2022  1:15 PM)        
no concerns

## 2023-07-16 NOTE — PATIENT PROFILE ADULT - NSTOBACCONEVERSMOKERY/N_GEN_A
- hx of HTN on nifedipine, coreg, hydralazine, labetalol, losartan at home   - given hydralazine 50mg po x1 for BP 190s this afternoon   Home Blood pressure medicines resumed Yes

## 2023-12-28 NOTE — PROVIDER CONTACT NOTE (OTHER) - ACTION/TREATMENT ORDERED:
start tele monitoring, cardiac enzymes performed.
EKG, tele monitoring.
will continue to monitor
DISPLAY PLAN FREE TEXT

## 2024-11-19 NOTE — ED PROVIDER NOTE - DISCHARGE DATE
PAST SURGICAL HISTORY:  S/P Laparoscopic Cholecystectomy     S/P Laparotomy for spleen removal     S/P Splenectomy     
13-Sep-2018